# Patient Record
Sex: FEMALE | Race: WHITE | NOT HISPANIC OR LATINO | URBAN - METROPOLITAN AREA
[De-identification: names, ages, dates, MRNs, and addresses within clinical notes are randomized per-mention and may not be internally consistent; named-entity substitution may affect disease eponyms.]

---

## 2017-06-06 ENCOUNTER — APPOINTMENT (RX ONLY)
Dept: URBAN - METROPOLITAN AREA CLINIC 24 | Facility: CLINIC | Age: 67
Setting detail: DERMATOLOGY
End: 2017-06-06

## 2017-06-06 DIAGNOSIS — L81.4 OTHER MELANIN HYPERPIGMENTATION: ICD-10-CM

## 2017-06-06 DIAGNOSIS — D18.0 HEMANGIOMA: ICD-10-CM

## 2017-06-06 DIAGNOSIS — L72.0 EPIDERMAL CYST: ICD-10-CM

## 2017-06-06 DIAGNOSIS — I78.8 OTHER DISEASES OF CAPILLARIES: ICD-10-CM

## 2017-06-06 DIAGNOSIS — D22 MELANOCYTIC NEVI: ICD-10-CM

## 2017-06-06 DIAGNOSIS — L82.1 OTHER SEBORRHEIC KERATOSIS: ICD-10-CM

## 2017-06-06 PROBLEM — J30.1 ALLERGIC RHINITIS DUE TO POLLEN: Status: ACTIVE | Noted: 2017-06-06

## 2017-06-06 PROBLEM — D22.5 MELANOCYTIC NEVI OF TRUNK: Status: ACTIVE | Noted: 2017-06-06

## 2017-06-06 PROBLEM — D18.01 HEMANGIOMA OF SKIN AND SUBCUTANEOUS TISSUE: Status: ACTIVE | Noted: 2017-06-06

## 2017-06-06 PROCEDURE — ? OTHER

## 2017-06-06 PROCEDURE — 99203 OFFICE O/P NEW LOW 30 MIN: CPT

## 2017-06-06 PROCEDURE — ? COUNSELING

## 2017-06-06 ASSESSMENT — LOCATION DETAILED DESCRIPTION DERM
LOCATION DETAILED: RIGHT SUPERIOR LATERAL MALAR CHEEK
LOCATION DETAILED: UPPER STERNUM
LOCATION DETAILED: LEFT LATERAL ABDOMEN
LOCATION DETAILED: RIGHT INFERIOR MEDIAL MIDBACK
LOCATION DETAILED: RIGHT ANTERIOR PROXIMAL UPPER ARM
LOCATION DETAILED: RIGHT ANTERIOR DISTAL UPPER ARM
LOCATION DETAILED: LEFT PERIAREOLAR BREAST 12-1:00 REGION
LOCATION DETAILED: LEFT SUPERIOR MEDIAL UPPER BACK
LOCATION DETAILED: RIGHT BUTTOCK
LOCATION DETAILED: LEFT CENTRAL MALAR CHEEK
LOCATION DETAILED: RIGHT ANTERIOR PROXIMAL THIGH
LOCATION DETAILED: LEFT MID-UPPER BACK

## 2017-06-06 ASSESSMENT — LOCATION SIMPLE DESCRIPTION DERM
LOCATION SIMPLE: LEFT BREAST
LOCATION SIMPLE: RIGHT UPPER ARM
LOCATION SIMPLE: RIGHT LOWER BACK
LOCATION SIMPLE: RIGHT THIGH
LOCATION SIMPLE: RIGHT BUTTOCK
LOCATION SIMPLE: RIGHT CHEEK
LOCATION SIMPLE: LEFT CHEEK
LOCATION SIMPLE: LEFT UPPER BACK
LOCATION SIMPLE: CHEST
LOCATION SIMPLE: ABDOMEN

## 2017-06-06 ASSESSMENT — LOCATION ZONE DERM
LOCATION ZONE: FACE
LOCATION ZONE: TRUNK
LOCATION ZONE: LEG
LOCATION ZONE: ARM

## 2017-06-06 NOTE — PROCEDURE: OTHER
Note Text (......Xxx Chief Complaint.): This diagnosis correlates with the
Detail Level: Simple
Other (Free Text): Discussed can be lasered with Yag

## 2017-10-27 ENCOUNTER — APPOINTMENT (RX ONLY)
Dept: URBAN - METROPOLITAN AREA CLINIC 24 | Facility: CLINIC | Age: 67
Setting detail: DERMATOLOGY
End: 2017-10-27

## 2017-10-27 DIAGNOSIS — L72.0 EPIDERMAL CYST: ICD-10-CM

## 2017-10-27 PROCEDURE — ? COUNSELING

## 2017-10-27 PROCEDURE — ? BENIGN DESTRUCTION COSMETIC

## 2017-10-27 ASSESSMENT — LOCATION DETAILED DESCRIPTION DERM: LOCATION DETAILED: RIGHT LATERAL CANTHUS

## 2017-10-27 ASSESSMENT — LOCATION ZONE DERM: LOCATION ZONE: EYELID

## 2017-10-27 ASSESSMENT — LOCATION SIMPLE DESCRIPTION DERM: LOCATION SIMPLE: RIGHT EYELID

## 2017-10-27 NOTE — PROCEDURE: BENIGN DESTRUCTION COSMETIC
Consent: The patient's consent was obtained including but not limited to risks of crusting, scabbing, blistering, scarring, darker or lighter pigmentary change, recurrence, incomplete removal and infection.
Price (Use Numbers Only, No Special Characters Or $): 20
Post-Care Instructions: I reviewed with the patient in detail post-care instructions. Patient is to wear sunprotection, and avoid picking at any of the treated lesions. Pt may apply Vaseline to crusted or scabbing areas.
Anesthesia Volume In Cc: 0.5
Detail Level: Detailed

## 2018-06-18 ENCOUNTER — APPOINTMENT (RX ONLY)
Dept: URBAN - METROPOLITAN AREA CLINIC 24 | Facility: CLINIC | Age: 68
Setting detail: DERMATOLOGY
End: 2018-06-18

## 2018-06-18 DIAGNOSIS — Z41.9 ENCOUNTER FOR PROCEDURE FOR PURPOSES OTHER THAN REMEDYING HEALTH STATE, UNSPECIFIED: ICD-10-CM

## 2018-06-18 DIAGNOSIS — I78.8 OTHER DISEASES OF CAPILLARIES: ICD-10-CM

## 2018-06-18 DIAGNOSIS — L82.1 OTHER SEBORRHEIC KERATOSIS: ICD-10-CM

## 2018-06-18 DIAGNOSIS — L57.0 ACTINIC KERATOSIS: ICD-10-CM

## 2018-06-18 DIAGNOSIS — L30.4 ERYTHEMA INTERTRIGO: ICD-10-CM

## 2018-06-18 DIAGNOSIS — K13.0 DISEASES OF LIPS: ICD-10-CM

## 2018-06-18 DIAGNOSIS — L81.4 OTHER MELANIN HYPERPIGMENTATION: ICD-10-CM

## 2018-06-18 DIAGNOSIS — D18.0 HEMANGIOMA: ICD-10-CM

## 2018-06-18 PROBLEM — L55.1 SUNBURN OF SECOND DEGREE: Status: ACTIVE | Noted: 2018-06-18

## 2018-06-18 PROBLEM — D18.01 HEMANGIOMA OF SKIN AND SUBCUTANEOUS TISSUE: Status: ACTIVE | Noted: 2018-06-18

## 2018-06-18 PROCEDURE — ? LIQUID NITROGEN (COSMETIC)

## 2018-06-18 PROCEDURE — ? COSMETIC CONSULTATION: FILLERS

## 2018-06-18 PROCEDURE — ? LIQUID NITROGEN

## 2018-06-18 PROCEDURE — 17000 DESTRUCT PREMALG LESION: CPT

## 2018-06-18 PROCEDURE — ? OTHER

## 2018-06-18 PROCEDURE — ? COUNSELING

## 2018-06-18 PROCEDURE — 99214 OFFICE O/P EST MOD 30 MIN: CPT | Mod: 25

## 2018-06-18 PROCEDURE — ? PRESCRIPTION

## 2018-06-18 RX ORDER — ZINC OXIDE 12.8 G/100G
PASTE TOPICAL
Qty: 1 | Refills: 3 | Status: ERX | COMMUNITY
Start: 2018-06-18

## 2018-06-18 RX ORDER — MICONAZOLE NITRATE 20.6 MG/G
POWDER TOPICAL
Qty: 1 | Refills: 5 | Status: ERX | COMMUNITY
Start: 2018-06-18

## 2018-06-18 RX ADMIN — ZINC OXIDE: 12.8 PASTE TOPICAL at 00:00

## 2018-06-18 RX ADMIN — MICONAZOLE NITRATE: 20.6 POWDER TOPICAL at 00:00

## 2018-06-18 ASSESSMENT — LOCATION SIMPLE DESCRIPTION DERM
LOCATION SIMPLE: LEFT CHEEK
LOCATION SIMPLE: ABDOMEN
LOCATION SIMPLE: LEFT SHOULDER
LOCATION SIMPLE: RIGHT CHEEK
LOCATION SIMPLE: RIGHT LIP
LOCATION SIMPLE: RIGHT LOWER BACK
LOCATION SIMPLE: RIGHT SHOULDER
LOCATION SIMPLE: LEFT LIP
LOCATION SIMPLE: LEFT UPPER BACK

## 2018-06-18 ASSESSMENT — LOCATION DETAILED DESCRIPTION DERM
LOCATION DETAILED: RIGHT SUPERIOR MEDIAL BUCCAL CHEEK
LOCATION DETAILED: RIGHT SUPERIOR VERMILION LIP
LOCATION DETAILED: LEFT MID-UPPER BACK
LOCATION DETAILED: SUBXIPHOID
LOCATION DETAILED: RIGHT INFERIOR MEDIAL MIDBACK
LOCATION DETAILED: RIGHT POSTERIOR SHOULDER
LOCATION DETAILED: LEFT ANTERIOR SHOULDER
LOCATION DETAILED: LEFT INFERIOR CENTRAL MALAR CHEEK
LOCATION DETAILED: RIGHT CENTRAL MALAR CHEEK
LOCATION DETAILED: LEFT POSTERIOR SHOULDER
LOCATION DETAILED: LEFT ORAL COMMISSURE

## 2018-06-18 ASSESSMENT — LOCATION ZONE DERM
LOCATION ZONE: ARM
LOCATION ZONE: TRUNK
LOCATION ZONE: FACE
LOCATION ZONE: LIP

## 2018-06-18 NOTE — PROCEDURE: LIQUID NITROGEN (COSMETIC)
Consent: The patient's consent was obtained including but not limited to risks of crusting, scabbing, blistering, scarring, darker or lighter pigmentary change, recurrence, incomplete removal and infection. The patient understands that the procedure is cosmetic in nature and is not covered by insurance.
Detail Level: Detailed
Price (Use Numbers Only, No Special Characters Or $): 50
Post-Care Instructions: I reviewed with the patient in detail post-care instructions. Patient is to wear sunprotection, and avoid picking at any of the treated lesions. Pt may apply Vaseline to crusted or scabbing areas.
Render Post-Care Instructions In Note?: no

## 2018-06-18 NOTE — PROCEDURE: OTHER
Other (Free Text): Discussed laser pt declined
Note Text (......Xxx Chief Complaint.): This diagnosis correlates with the
Other (Free Text): Discussed cosmetic freeze for $50
Detail Level: Simple
Other (Free Text): OTC antifungal cream

## 2019-06-17 ENCOUNTER — APPOINTMENT (RX ONLY)
Dept: URBAN - METROPOLITAN AREA CLINIC 24 | Facility: CLINIC | Age: 69
Setting detail: DERMATOLOGY
End: 2019-06-17

## 2019-06-17 DIAGNOSIS — L98.8 OTHER SPECIFIED DISORDERS OF THE SKIN AND SUBCUTANEOUS TISSUE: ICD-10-CM

## 2019-06-17 DIAGNOSIS — L81.4 OTHER MELANIN HYPERPIGMENTATION: ICD-10-CM

## 2019-06-17 DIAGNOSIS — K13.0 DISEASES OF LIPS: ICD-10-CM

## 2019-06-17 DIAGNOSIS — L82.1 OTHER SEBORRHEIC KERATOSIS: ICD-10-CM

## 2019-06-17 DIAGNOSIS — D18.0 HEMANGIOMA: ICD-10-CM

## 2019-06-17 PROBLEM — I10 ESSENTIAL (PRIMARY) HYPERTENSION: Status: ACTIVE | Noted: 2019-06-17

## 2019-06-17 PROBLEM — D18.01 HEMANGIOMA OF SKIN AND SUBCUTANEOUS TISSUE: Status: ACTIVE | Noted: 2019-06-17

## 2019-06-17 PROCEDURE — ? OTHER

## 2019-06-17 PROCEDURE — 99214 OFFICE O/P EST MOD 30 MIN: CPT

## 2019-06-17 PROCEDURE — ? COUNSELING

## 2019-06-17 ASSESSMENT — LOCATION DETAILED DESCRIPTION DERM
LOCATION DETAILED: LEFT RIB CAGE
LOCATION DETAILED: EPIGASTRIC SKIN
LOCATION DETAILED: LEFT POSTERIOR SHOULDER
LOCATION DETAILED: LEFT ANTERIOR SHOULDER
LOCATION DETAILED: LEFT SUPERIOR LATERAL LOWER BACK
LOCATION DETAILED: RIGHT MID-UPPER BACK
LOCATION DETAILED: RIGHT SUPERIOR VERMILION LIP
LOCATION DETAILED: RIGHT POSTERIOR SHOULDER
LOCATION DETAILED: RIGHT DISTAL RADIAL DORSAL FOREARM
LOCATION DETAILED: LEFT DISTAL PRETIBIAL REGION
LOCATION DETAILED: LEFT SUPERIOR NASAL CHEEK
LOCATION DETAILED: RIGHT CENTRAL MALAR CHEEK
LOCATION DETAILED: LEFT INFERIOR HELIX
LOCATION DETAILED: LEFT ORAL COMMISSURE
LOCATION DETAILED: RIGHT INFERIOR MEDIAL MIDBACK
LOCATION DETAILED: RIGHT ANTERIOR DISTAL THIGH

## 2019-06-17 ASSESSMENT — LOCATION SIMPLE DESCRIPTION DERM
LOCATION SIMPLE: RIGHT CHEEK
LOCATION SIMPLE: LEFT LOWER BACK
LOCATION SIMPLE: LEFT PRETIBIAL REGION
LOCATION SIMPLE: LEFT SHOULDER
LOCATION SIMPLE: LEFT EAR
LOCATION SIMPLE: LEFT LIP
LOCATION SIMPLE: RIGHT LOWER BACK
LOCATION SIMPLE: RIGHT UPPER BACK
LOCATION SIMPLE: RIGHT SHOULDER
LOCATION SIMPLE: RIGHT THIGH
LOCATION SIMPLE: RIGHT LIP
LOCATION SIMPLE: RIGHT FOREARM
LOCATION SIMPLE: LEFT CHEEK
LOCATION SIMPLE: ABDOMEN

## 2019-06-17 ASSESSMENT — LOCATION ZONE DERM
LOCATION ZONE: TRUNK
LOCATION ZONE: ARM
LOCATION ZONE: LEG
LOCATION ZONE: LIP
LOCATION ZONE: FACE
LOCATION ZONE: EAR

## 2019-06-17 NOTE — PROCEDURE: OTHER
Other (Free Text): OTC antifungal cream\\nCan put  to help with collecting moisture
Note Text (......Xxx Chief Complaint.): This diagnosis correlates with the
Detail Level: Simple
Other (Free Text): Can have laser treatment if desires with Clarice

## 2019-07-22 ENCOUNTER — HOSPITAL ENCOUNTER (OUTPATIENT)
Dept: PHYSICAL THERAPY | Age: 69
Discharge: HOME | End: 2019-07-22
Payer: MEDICARE

## 2019-07-22 PROCEDURE — 97161 PT EVAL LOW COMPLEX 20 MIN: CPT

## 2019-07-22 PROCEDURE — 97110 THERAPEUTIC EXERCISES: CPT

## 2019-07-22 NOTE — PROGRESS NOTES
Erin Garg Trever  : 1950  [place]  Secondary: [address] at [address]  [phone]   [phone]    OUTPATIENT PHYSICAL THERAPY: Daily Treatment Note 2019  Visit Count:  1    ICD-10: Treatment Diagnosis: pain in joint, shoulder, left (M25.512); adhesive capsulitis of shoulder, left (M75.02)   Precautions/Allergies:   Penicillin v and Potassium   TREATMENT PLAN:  Effective Dates: 2019 TO 2019 (60 days). Frequency/Duration: 2 times a week for 60 Day(s)    Pre-treatment Symptoms/Complaints:  Patient reports tightness and tenderness in her shoulder   Pain: Initial: Pain Intensity 1: 8 10 Post Session:  4/10   Medications Last Reviewed:  2019  Updated Objective Findings:  See evaluation note from today  TREATMENT:     Manual Therapy (     ): Manual techniques to facilitate improved motion and decreased pain. (Used abbreviations: MET - muscle energy technique; PNF - proprioceptive neuromuscular facilitation; NMR - neuromuscular re-education; a/p - anterior to posterior; p/a - posterior to anterior)   · None today   Therapeutic Exercise: (10 Minutes):  Exercises per grid below to improve mobility and strength. Required minimal manual and tactile cues to promote proper body mechanics. Progressed resistance, range, repetitions and complexity of movement as indicated. 2019   Activity/Exercise Parameters                 Patient education Plan of care, expectations with progression   t-bar ER  10 x 10 sec hold    aarom flexion  Supine and counter walk backs each 10 x 10 sec                        [facility] Portal  Treatment/Session Summary:    · Response to Treatment:  Patient reported improved stiffness following treatment.  .  · Communication/Consultation:  None today  · Equipment provided today:  HEP  · Recommendations/Intent for next treatment session: Next visit will focus on progression of passive ROM.     Total Treatment Billable Duration:  10 minutes  PT Patient Time In/Time Out  Time In: 0930  Time Out: 56  Juan Rosenbaum, PT    Future Appointments   Date Time Provider Jc Rose   7/24/2019  9:30 AM [facility]   8/6/2019 10:30 AM Shamika Miller, PT [facility]   8/8/2019 10:30 AM Enriqueta RODRIGEZ, PT [facility]   8/13/2019 10:30 AM Enriqueta RODRIGEZ, PT [facility]   8/15/2019 10:30 AM Enriqueta RODRIGEZ, PT [facility]   8/20/2019 10:30 AM Enriqueta RODRIGEZ, PT [facility]   8/22/2019 10:30 AM Enriqueta RODRIGEZ, PT [facility]   8/27/2019 10:30 AM Enriqueta RODRIGEZ, PT [facility]   8/29/2019 10:30 AM Andrew Barajas, PT [facility]

## 2019-07-22 NOTE — THERAPY EVALUATION
Indy Perera  : 1950  [place]  Secondary: [address] at [address]  [phone]   [phone]       OUTPATIENT PHYSICAL THERAPY:Initial Assessment 2019   ICD-10: Treatment Diagnosis: pain in joint, shoulder, left (M25.512); adhesive capsulitis of shoulder, left (M75.02)   Precautions/Allergies:   Penicillin v and Potassium   TREATMENT PLAN:  Effective Dates: 2019 TO 2019 (60 days). Frequency/Duration: 2 times a week for 60 Day(s) MEDICAL/REFERRING DIAGNOSIS:  Left shoulder pain [M25.512]   DATE OF ONSET: 4-5 months   REFERRING PHYSICIAN: Alexis Elias MD MD Orders: evaluate and treat   Return MD Appointment: 1 month      INITIAL ASSESSMENT:  Ms. Ivon Albert is a 80year old female with left shoulder pain that began 4-5 months ago with no specific mechanism or injury. She reports a gradual worsening of movement and pain that has limited her ability to reach, lift, and get dressed without pain. She presents to PT with decreased passive and active ROM, mildly decreased strength, decreased functional reaching, and decreased joint mobility that contribute to her functional limitations. Pt will benefit from skilled PT to address above listed impairments and functional limitations to facilitate return to prior level of function. PROBLEM LIST (Impacting functional limitations):  1. Decreased Strength  2. Decreased ADL/Functional Activities  3. Increased Pain  4. Decreased Flexibility/Joint Mobility INTERVENTIONS PLANNED: (Treatment may consist of any combination of the following)  1. Cryotherapy  2. Heat  3. Home Exercise Program (HEP)  4. Manual Therapy  5. Neuromuscular Re-education/Strengthening  6. Range of Motion (ROM)  7. Therapeutic Activites  8.  Therapeutic Exercise/Strengthening     GOALS: (Goals have been discussed and agreed upon with patient.)  Discharge Goals: Time Frame: by 9/20/19   1. Patient will report no limitation in reaching into an overhead cabinet to put away dishes. 2. Patient will report no limitation in reaching behind her back to wash her back or put on her robe. 3. Patient will demonstrate improvement in function with quickDASH to 20% or less disability. OUTCOME MEASURE:   Tool Used: Disabilities of the Arm, Shoulder and Hand (DASH) Questionnaire - Quick Version  Score:  Initial: 34/55 (52% disability)   Most Recent: X/55 (Date: -- )   Interpretation of Score: The DASH is designed to measure the activities of daily living in person's with upper extremity dysfunction or pain. Each section is scored on a 1-5 scale, 5 representing the greatest disability. The scores of each section are added together for a total score of 55. MEDICAL NECESSITY:   · Patient is expected to demonstrate progress in strength and range of motion to increase independence with reaching. REASON FOR SERVICES/OTHER COMMENTS:  · Patient has demonstrated an improvement in functional level by demonstrating improvement in functional reaching. Total Duration:  PT Patient Time In/Time Out  Time In: 0930  Time Out: 1030    Rehabilitation Potential For Stated Goals: Good  Regarding Selena Cedeno therapy, I certify that the treatment plan above will be carried out by a therapist or under their direction. Thank you for this referral,  Baylee Gan, PT     Referring Physician Signature: Tuyet Anderson MD                 PAIN/SUBJECTIVE:   Initial: Pain Intensity 1: 8  Post Session:  4/10   HISTORY:   History of Injury/Illness (Reason for Referral):  Patient reports left shoulder pain that began with no mechanism or injury 4-5 months ago and has gradually worsened. She reports pain that ranges from 4-8/10 and worsens with reaching and lifting.   She reports difficulty reaching overhead, reaching behind her back to wash her back and put on her robe, and to the back of her head. She reports that she saw her PCP who referred her to a neurologist and had a NCV that was unremarkable and that she then saw Dr Steph Harden who diagnosed frozen shoulder. She reports some temporary improvement with a shoulder injection. Past Medical History/Comorbidities:   Ms. Janessa Farooq  has a past medical history of Cancer of kidney ([facility] [address] (2013), Fatigue, Fibromyalgia, Hypertension (1998), Hypothyroidism (2001), Infection of left inner ear (2009), Interstitial cystitis, RA (rheumatoid arthritis) ([address] (2005), and Reflux esophagitis. Ms. Janessa Farooq  has a past surgical history that includes hx hysterectomy (1976); hx cholecystectomy (2001); and hx nephrectomy (2013). Social History/Living Environment:     Patient lives in a private home with her spouse. Prior Level of Function/Work/Activity:  Patient is retired. No prior limitation in reaching or lifting reported. Dominant Side:         RIGHT   Ambulatory/Rehab Services H2 Model Falls Risk Assessment   Risk Factors:       No Risk Factors Identified Ability to Rise from Chair:       (1)  Pushes up, successful in one attempt   Falls Prevention Plan:       No modifications necessary   Total: (5 or greater = High Risk): 1   ©2010 [employer]. All Rights Reserved. [facility] Patent #[id number]. Federal Law prohibits the replication, distribution or use without written permission from [employer]   Current Medications:       Current Outpatient Medications:     budesonide-formoterol (SYMBICORT) 160-4.5 mcg/actuation HFA inhaler, Take 2 Puffs by inhalation two (2) times a day., Disp: , Rfl:     fluticasone (FLONASE) 50 mcg/actuation nasal spray, nightly., Disp: , Rfl:     diphenhydrAMINE (BENADRYL ALLERGY) 25 mg tablet, Take 25 mg by mouth every six (6) hours as needed for Sleep., Disp: , Rfl:     azelastine (ASTELIN) 137 mcg nasal spray, 1 Spray two (2) times a day.  Use in each nostril as directed, Disp: , Rfl:     cetirizine (ZYRTEC) 10 mg tablet, Take  by mouth daily. , Disp: , Rfl:     guaiFENesin-dextromethorphan (MUCUS RELIEF DM)  mg tab tablet, Take 1 Tab by mouth daily. , Disp: , Rfl:     omeprazole (PRILOSEC) 20 mg capsule, Take 20 mg by mouth two (2) times daily as needed. , Disp: , Rfl:     levothyroxine (SYNTHROID) 112 mcg tablet, Take  by mouth Daily (before breakfast). , Disp: , Rfl:     losartan-hydrochlorothiazide (HYZAAR) 100-25 mg per tablet, Take 1 tablet by mouth daily. , Disp: , Rfl:     folic acid 739 mcg tablet, Take 1,600 mcg by mouth daily. , Disp: , Rfl:     ibuprofen (MOTRIN) 200 mg tablet, Take 200 mg by mouth as needed. , Disp: , Rfl:     multivitamin (ONE A DAY) tablet, Take 1 Tab by mouth daily. Ca 220, D 500, Disp: , Rfl:     cyanocobalamin (VITAMIN B-12) 100 mcg tablet, Take 100 mcg by mouth daily. , Disp: , Rfl:     pentosan polysulfate sodium (ELMIRON) 100 mg capsule, Take 200 mg by mouth nightly., Disp: , Rfl:    Date Last Reviewed:  7/22/2019   Number of Personal Factors/Comorbidities that affect the Plan of Care: 0: LOW COMPLEXITY   EXAMINATION:   Observation/palpation/joint mobility: hypomobility and mild pain reported with posterior and inferior glenohumeral joint mobilizations on left compared to right     Range of Motion:  Passive (active)  Left* Right   Flexion  130 (102) 165 (155)    External Rotation  52 80   Internal Rotation  53 65      Strength:   Date: 7/22/19   Manual Muscle Test out of 5   Flexion B: 4+/5    Abduction L: 3/5, R: 4+/5    External Rotation B: 4/5    Internal Rotaton L: 4/5, R: 4+/5      Functional Tests:    Apley's Internal Rotation: L: buttock, R: T12  Apley's External Rotation: L: base of skull, R: T3   Forward Flexion: scapular hiking >90 flexion        Body Structures Involved:  1. Joints  2. Muscles Body Functions Affected:  1. Sensory/Pain  2. Neuromusculoskeletal  3. Movement Related Activities and Participation Affected:  1. General Tasks and Demands  2.  Self Care  3. Domestic Life  4. Interpersonal Interactions and Relationships  5.  Community, Social and [place]   Number of elements (examined above) that affect the Plan of Care: 4+: HIGH COMPLEXITY   CLINICAL PRESENTATION:   Presentation: Stable and uncomplicated: LOW COMPLEXITY   CLINICAL DECISION MAKING:   Use of outcome tool(s) and clinical judgement create a POC that gives a: Clear prediction of patient's progress: LOW COMPLEXITY

## 2019-07-24 ENCOUNTER — HOSPITAL ENCOUNTER (OUTPATIENT)
Dept: PHYSICAL THERAPY | Age: 69
Discharge: HOME | End: 2019-07-24
Payer: MEDICARE

## 2019-07-24 PROCEDURE — 97110 THERAPEUTIC EXERCISES: CPT

## 2019-07-24 PROCEDURE — 97140 MANUAL THERAPY 1/> REGIONS: CPT

## 2019-07-24 NOTE — PROGRESS NOTES
Lyubov Perera  : 1950  [place]  Secondary: [address] at [facility]  [address], [address]  [phone]   [phone]    OUTPATIENT PHYSICAL THERAPY: Daily Treatment Note 2019  Visit Count:  2    ICD-10: Treatment Diagnosis: pain in joint, shoulder, left (M25.512); adhesive capsulitis of shoulder, left (M75.02)   Precautions/Allergies:   Penicillin v and Potassium   TREATMENT PLAN:  Effective Dates: 2019 TO 2019 (60 days). Frequency/Duration: 2 times a week for 60 Day(s)    Pre-treatment Symptoms/Complaints:  Patient reports that her shoulder is doing a little bit better but that she wants to review her HEP before she travels for vacation next week. Pain: Initial: Pain Intensity 1: 4 /10 Post Session:  4/10   Medications Last Reviewed:  2019  Updated Objective Findings:  see below   Range of Motion:  Passive (active)  Left* Right   Flexion  130 (102) 165 (155)    External Rotation  52 80   Internal Rotation  53 65        TREATMENT:     Manual Therapy (    Soft Tissue Mobilization Duration  Duration: 30 Minutes): Manual techniques to facilitate improved motion and decreased pain. (Used abbreviations: MET - muscle energy technique; PNF - proprioceptive neuromuscular facilitation; NMR - neuromuscular re-education; a/p - anterior to posterior; p/a - posterior to anterior)   · Manual joint mobilizations: posterior, inferior, and posterior/inferior glenohumeral joint mobilizations  · Long axis traction   · Manual ROM with contract relax techniques: flexion, IR, and ER at neutral and 90 degrees     Therapeutic Exercise: (23 Minutes):  Exercises per grid below to improve mobility and strength. Required minimal manual and tactile cues to promote proper body mechanics. Progressed resistance, range, repetitions and complexity of movement as indicated.        2019   Activity/Exercise Parameters Patient education IR stretch to HEP    t-bar ER  10 x 10 sec hold    aarom flexion supine  Supine 10 x 10 sec    IR stretch with strap  10 x 10 sec hold    AAROM flexion standing  Ball with endrange hold: 10 x 10 sec, ball up 1 wedge 30 reps                [employer] Portal  Treatment/Session Summary:    · Response to Treatment:  Patient reported some soreness with IR stretch today. She tolerated the rest of the treatment well and did not demonstrate improvement in quantity of motion, but in report of pain with achieving that motion. Added IR stretch to HEP for patient to perform while on vacation next week. · Communication/Consultation:  None today  · Equipment provided today:  HEP  · Recommendations/Intent for next treatment session: Next visit will focus on progression of passive ROM.     Total Treatment Billable Duration:  53 minutes  PT Patient Time In/Time Out  Time In: 0930  Time Out: 1  Amelia Ibarra PT    Future Appointments   Date Time Provider Jc Rose   8/6/2019 10:30 AM Zeferino Peralta [place] [facility]   8/8/2019 10:30 AM Zeferino Peralta, PT [facility]   8/13/2019 10:30 AM Ethan RODRIGEZ, PT [facility]   8/15/2019 10:30 AM Ethan RODRIGEZ, PT [facility]   8/20/2019 10:30 AM Ethan RODRIGEZ, PT [facility]   8/22/2019 10:30 AM Ethan RODRIGEZ, PT [facility]   8/27/2019 10:30 AM Ethna RODRIGEZ, PT [facility]   8/29/2019 10:30 AM Keshawn Barajas, PT [facility]

## 2019-08-06 ENCOUNTER — HOSPITAL ENCOUNTER (OUTPATIENT)
Dept: PHYSICAL THERAPY | Age: 69
Discharge: HOME | End: 2019-08-06
Payer: MEDICARE

## 2019-08-06 PROCEDURE — 97140 MANUAL THERAPY 1/> REGIONS: CPT

## 2019-08-06 PROCEDURE — 97110 THERAPEUTIC EXERCISES: CPT

## 2019-08-06 NOTE — PROGRESS NOTES
Honey Perera  : 1950  [place]  Secondary: [address] at [address]  [phone]   [phone]    OUTPATIENT PHYSICAL THERAPY: Daily Treatment Note 2019  Visit Count:  3    ICD-10: Treatment Diagnosis: pain in joint, shoulder, left (M25.512); adhesive capsulitis of shoulder, left (M75.02)   Precautions/Allergies:   Penicillin v and Potassium   TREATMENT PLAN:  Effective Dates: 2019 TO 2019 (60 days). Frequency/Duration: 2 times a week for 60 Day(s)    Pre-treatment Symptoms/Complaints:  Patient reports that she did not do a lot of her exercises over her vacation, but that she worked it in the water and that it seems to be doing better. Pain: Initial: Pain Intensity 1: 3 /10 Post Session:  4/10   Medications Last Reviewed:  2019  Updated Objective Findings:  see below   Range of Motion:  Passive (active)  Left* Right   Flexion  145 (102) 165 (155)    External Rotation  70 80   Internal Rotation  60 65        TREATMENT:     Manual Therapy (    Soft Tissue Mobilization Duration  Duration: 25 Minutes): Manual techniques to facilitate improved motion and decreased pain. (Used abbreviations: MET - muscle energy technique; PNF - proprioceptive neuromuscular facilitation; NMR - neuromuscular re-education; a/p - anterior to posterior; p/a - posterior to anterior)   · Manual joint mobilizations: posterior, inferior, and posterior/inferior glenohumeral joint mobilizations  · Long axis traction   · Manual ROM with contract relax techniques: flexion, IR, and ER at neutral and 90 degrees     Therapeutic Exercise: (28 Minutes):  Exercises per grid below to improve mobility and strength. Required minimal manual and tactile cues to promote proper body mechanics. Progressed resistance, range, repetitions and complexity of movement as indicated.        2019   Activity/Exercise Parameters                 Patient education --   t-bar ER  --   aarom flexion supine  Supine 10 x 10 sec with cane    IR stretch with strap  10 x 10 sec hold    AAROM flexion standing  Ball up wall: 2 x 10    Sidelying ER  3 x 10            [facility] Portal  Treatment/Session Summary:    · Response to Treatment:  Patient demonstrates improvement in passive range of motion today and overall improvement in subjective report of function. · Communication/Consultation:  None today  · Equipment provided today:  HEP  · Recommendations/Intent for next treatment session: Next visit will focus on progression of active ROM.     Total Treatment Billable Duration:  53 minutes  PT Patient Time In/Time Out  Time In: 1030  Time Out: Michele Myers PT    Future Appointments   Date Time Provider Jc Rose   8/8/2019 10:30 AM Parul Rodriges [facility]   8/13/2019 10:30 AM Robert Frias PT [facility]   8/15/2019 10:30 AM Keely RODRIGEZ, PT [facility]   8/20/2019 10:30 AM Keely RODRIGEZ, PT [facility]   8/22/2019 10:30 AM Keely RODRIGEZ, PT [facility]   8/27/2019 10:30 AM Keely RODRIGEZ, PT [facility]   8/29/2019 10:30 AM Eric Barajas PT [facility]

## 2019-08-08 ENCOUNTER — HOSPITAL ENCOUNTER (OUTPATIENT)
Dept: PHYSICAL THERAPY | Age: 69
Discharge: HOME | End: 2019-08-08
Payer: MEDICARE

## 2019-08-08 PROCEDURE — 97140 MANUAL THERAPY 1/> REGIONS: CPT

## 2019-08-08 PROCEDURE — 97110 THERAPEUTIC EXERCISES: CPT

## 2019-08-08 NOTE — PROGRESS NOTES
Marciano Perera  : 1950  [place]  Secondary: [address] at [facility], [address]  [phone]   [phone]    OUTPATIENT PHYSICAL THERAPY: Daily Treatment Note 2019  Visit Count:  4    ICD-10: Treatment Diagnosis: pain in joint, shoulder, left (M25.512); adhesive capsulitis of shoulder, left (M75.02)   Precautions/Allergies:   Penicillin v and Potassium   TREATMENT PLAN:  Effective Dates: 2019 TO 2019 (60 days). Frequency/Duration: 2 times a week for 60 Day(s)    Pre-treatment Symptoms/Complaints:  Patient reports that her shoulder was mildly sore after her last treatment but overall better. Pain: Initial: Pain Intensity 1: 2 10 Post Session:  4/10   Medications Last Reviewed:  2019  Updated Objective Findings:  see below   Range of Motion:  Passive (active)  Left* Right   Flexion  145 (102) 165 (155)    External Rotation  70 80   Internal Rotation  60 65        TREATMENT:     Manual Therapy (    Soft Tissue Mobilization Duration  Duration: 25 Minutes): Manual techniques to facilitate improved motion and decreased pain. (Used abbreviations: MET - muscle energy technique; PNF - proprioceptive neuromuscular facilitation; NMR - neuromuscular re-education; a/p - anterior to posterior; p/a - posterior to anterior)   · Manual joint mobilizations: posterior, inferior, and posterior/inferior glenohumeral joint mobilizations  · Long axis traction   · Manual ROM with contract relax techniques: flexion, IR, and ER at neutral and 90 degrees     Therapeutic Exercise: (15 Minutes):  Exercises per grid below to improve mobility and strength. Required minimal manual and tactile cues to promote proper body mechanics. Progressed resistance, range, repetitions and complexity of movement as indicated.        2019   Activity/Exercise Parameters                 Patient education --   t-bar ER --   aarom flexion supine  Supine 10 x 10 sec with cane    IR stretch with strap  10 x 10 sec hold    AAROM flexion standing  Ball up wall: 2 x 10    Sidelying ER  --   Rows  Green cord: 2 x 10    Resisted ER  2 x 10 orange cord    Resisted IR  2 x 10 green cord       MedBridge Portal  Treatment/Session Summary:    · Response to Treatment:  Patient continues to progress well. Patient with increased soreness following IR stretching, but overall good tolerance. · Communication/Consultation:  None today  · Equipment provided today:  HEP  · Recommendations/Intent for next treatment session: Next visit will focus on progression of active ROM.     Total Treatment Billable Duration:  45 minutes  PT Patient Time In/Time Out  Time In: 1035  Time Out: Brie 1, PT    Future Appointments   Date Time Provider Jc Rose   8/13/2019 10:30 AM Parul Montes [facility]   8/15/2019 10:30 AM China RODRIGEZ, PT [facility]   8/20/2019 10:30 AM China RODRIGEZ [facility]   8/22/2019 10:30 AM China RODRIGEZ, [facility]   8/27/2019 10:30 AM China RODRIGEZ, PT [facility]   8/29/2019 10:30 AM Milan Barajas, PT [facility]

## 2019-08-13 ENCOUNTER — HOSPITAL ENCOUNTER (OUTPATIENT)
Dept: PHYSICAL THERAPY | Age: 69
Discharge: HOME | End: 2019-08-13
Payer: MEDICARE

## 2019-08-13 PROCEDURE — 97110 THERAPEUTIC EXERCISES: CPT

## 2019-08-13 PROCEDURE — 97140 MANUAL THERAPY 1/> REGIONS: CPT

## 2019-08-13 NOTE — PROGRESS NOTES
Lyubov Perera  : 1950  [place]  Secondary: [address] at [address]  [phone]   [phone]    OUTPATIENT PHYSICAL THERAPY: Daily Treatment Note 2019  Visit Count:  5    ICD-10: Treatment Diagnosis: pain in joint, shoulder, left (M25.512); adhesive capsulitis of shoulder, left (M75.02)   Precautions/Allergies:   Penicillin v and Potassium   TREATMENT PLAN:  Effective Dates: 2019 TO 2019 (60 days). Frequency/Duration: 2 times a week for 60 Day(s)    Pre-treatment Symptoms/Complaints:  Patient reports that she is doing well with washing her back now. Pain: Initial: Pain Intensity 1: 10 Post Session:  2/10   Medications Last Reviewed:  2019  Updated Objective Findings:  see below   Range of Motion:  Passive (active)  Left* Right   Flexion  145 (125) 165 (155)    External Rotation  70 80   Internal Rotation  60 65        TREATMENT:     Manual Therapy (    Soft Tissue Mobilization Duration  Duration: 40 Minutes): Manual techniques to facilitate improved motion and decreased pain. (Used abbreviations: MET - muscle energy technique; PNF - proprioceptive neuromuscular facilitation; NMR - neuromuscular re-education; a/p - anterior to posterior; p/a - posterior to anterior)   · Manual joint mobilizations: posterior, inferior, and posterior/inferior glenohumeral joint mobilizations  · Long axis traction   · Manual ROM with contract relax techniques: flexion, IR, and ER at neutral and 90 degrees  · STM to subscapularis and upper trapezius      Therapeutic Exercise: (15 Minutes):  Exercises per grid below to improve mobility and strength. Required minimal manual and tactile cues to promote proper body mechanics. Progressed resistance, range, repetitions and complexity of movement as indicated.        2019   Activity/Exercise Parameters                 Patient education --   t-bar ER  --   aarom flexion supine  Supine 10 x 10 sec with cane    IR stretch with strap  10 x 10 sec hold    AAROM flexion standing  Ball up wall: 2 x 10 and wall slides 10 x 10 sec    Sidelying ER  --   Rows  --   Resisted ER  --   Resisted IR  --       [facility] Portal  Treatment/Session Summary:    · Response to Treatment:  Patient demonstrated gains in active ROM noted today. · Communication/Consultation:  None today  · Equipment provided today:  None today  · Recommendations/Intent for next treatment session: Next visit will focus on progression of active ROM.     Total Treatment Billable Duration:  45 minutes  PT Patient Time In/Time Out  Time In: 1030  Time Out: [phone]  Heidi Friends, PT    Future Appointments   Date Time Provider Jc Rose   8/15/2019 10:30 AM Parul Ya   [facility]   [facility]:30 [facility]   8/27/2019 10:30 [facility]   8/29/2019 10:30 AM Giselle Barajas, [facility]

## 2019-08-15 ENCOUNTER — HOSPITAL ENCOUNTER (OUTPATIENT)
Dept: PHYSICAL THERAPY | Age: 69
Discharge: HOME | End: 2019-08-15
Payer: MEDICARE

## 2019-08-15 PROCEDURE — 97110 THERAPEUTIC EXERCISES: CPT

## 2019-08-15 PROCEDURE — 97140 MANUAL THERAPY 1/> REGIONS: CPT

## 2019-08-15 NOTE — PROGRESS NOTES
Beuford Landau Styles  : 1950  [place]  Secondary: [address] at [place] [id number] [address], [address]  [phone]   [phone]    OUTPATIENT PHYSICAL THERAPY: Daily Treatment Note 8/15/2019  Visit Count:  6    ICD-10: Treatment Diagnosis: pain in joint, shoulder, left (M25.512); adhesive capsulitis of shoulder, left (M75.02)   Precautions/Allergies:   Penicillin v and Potassium   TREATMENT PLAN:  Effective Dates: 2019 TO 2019 (60 days). Frequency/Duration: 2 times a week for 60 Day(s)    Pre-treatment Symptoms/Complaints:  Patient reports that her shoulder isn't really sore unless she raises it. Pain: Initial: Pain Intensity 1: 0 /10 Post Session:  0/10   Medications Last Reviewed:  8/15/2019  Updated Objective Findings:  see below   Range of Motion:  Passive (active)  Left* Right   Flexion  145 (125) 165 (155)    External Rotation  70 80   Internal Rotation  60 65        TREATMENT:     Manual Therapy (    Soft Tissue Mobilization Duration  Duration: 30 Minutes): Manual techniques to facilitate improved motion and decreased pain. (Used abbreviations: MET - muscle energy technique; PNF - proprioceptive neuromuscular facilitation; NMR - neuromuscular re-education; a/p - anterior to posterior; p/a - posterior to anterior)   · Manual joint mobilizations: posterior, inferior, and posterior/inferior glenohumeral joint mobilizations  · Long axis traction   · Manual ROM with contract relax techniques: flexion, IR, and ER at neutral and 90 degrees  · STM to subscapularis and upper trapezius      Therapeutic Exercise: (23 Minutes):  Exercises per grid below to improve mobility and strength. Required minimal manual and tactile cues to promote proper body mechanics. Progressed resistance, range, repetitions and complexity of movement as indicated.        8/15/2019   Activity/Exercise Parameters                 Patient education --   t-bar ER  --   aarom flexion supine  Supine 10 x 10 sec with cane    IR stretch with strap  --   AAROM flexion standing  Ball up wall: 2 x 10 and wall slides 10 x 10 sec    Sidelying ER  --   Rows  Green cord: 3 x 10    Resisted ER  [place] cord: 3 x 10    Resisted IR  Orange cord: 3 x 10        MedBridge Portal  Treatment/Session Summary:    · Response to Treatment:  Patient demonstrated improvement in quality of motion with active flexion today. · Communication/Consultation:  None today  · Equipment provided today:  None today  · Recommendations/Intent for next treatment session: Next visit will focus on progression of active ROM.     Total Treatment Billable Duration:  53 minutes  PT Patient Time In/Time Out  Time In: 1035  Time Out: 36  Juli Newman, PT    Future Appointments   Date Time Provider Jc Rose   8/20/2019 10:30 AM Parul Gutiérrez [facility]   8/22/2019 10:30 AM Starr Isaacs PT [facility]   8/27/2019 10:30 AM Starr Isaacs PT [facility]   8/29/2019 10:30 AM Reshma Barajas PT [facility]

## 2019-08-20 ENCOUNTER — APPOINTMENT (OUTPATIENT)
Dept: PHYSICAL THERAPY | Age: 69
End: 2019-08-20
Payer: MEDICARE

## 2019-08-22 ENCOUNTER — HOSPITAL ENCOUNTER (OUTPATIENT)
Dept: PHYSICAL THERAPY | Age: 69
Discharge: HOME | End: 2019-08-22
Payer: MEDICARE

## 2019-08-22 PROCEDURE — 97140 MANUAL THERAPY 1/> REGIONS: CPT

## 2019-08-22 PROCEDURE — 97110 THERAPEUTIC EXERCISES: CPT

## 2019-08-22 NOTE — PROGRESS NOTES
Roderick Field Styles  : 1950  [place]  Secondary: [address] at [address]  [phone]   [phone]    OUTPATIENT PHYSICAL THERAPY: Daily Treatment Note 2019  Visit Count:  7    ICD-10: Treatment Diagnosis: pain in joint, shoulder, left (M25.512); adhesive capsulitis of shoulder, left (M75.02)   Precautions/Allergies:   Penicillin v and Potassium   TREATMENT PLAN:  Effective Dates: 2019 TO 2019 (60 days). Frequency/Duration: 2 times a week for 60 Day(s)    Pre-treatment Symptoms/Complaints:  Patient reports that her shoulder isn't really sore unless she raises it. Pain: Initial: Pain Intensity 1: 0 /10 Post Session:  0/10   Medications Last Reviewed:  2019  Updated Objective Findings:  see below   Range of Motion:  Passive (active)  Left* Right   Flexion  155 (125) 165 (155)    External Rotation  70 80   Internal Rotation  60 65        TREATMENT:     Manual Therapy (    Soft Tissue Mobilization Duration  Duration: 25 Minutes): Manual techniques to facilitate improved motion and decreased pain. (Used abbreviations: MET - muscle energy technique; PNF - proprioceptive neuromuscular facilitation; NMR - neuromuscular re-education; a/p - anterior to posterior; p/a - posterior to anterior)   · Manual joint mobilizations: posterior, inferior, and posterior/inferior glenohumeral joint mobilizations  · Long axis traction   · Manual ROM with contract relax techniques: flexion, IR, and ER at neutral and 90 degrees  · STM to subscapularis and upper trapezius      Therapeutic Exercise: (30 Minutes):  Exercises per grid below to improve mobility and strength. Required minimal manual and tactile cues to promote proper body mechanics. Progressed resistance, range, repetitions and complexity of movement as indicated.        2019   Activity/Exercise Parameters                 Patient education --   aarom flexion supine  Supine 10 x 10 sec with cane    IR stretch with strap  10 x 10 sec    Self mobilization Flexion and cross body with purple strap 2 x 1 minute    AAROM flexion standing  Ball up wall: 2 x 10    Sidelying ER  --   Rows  Green cord: 3 x 10    Resisted ER  [place] cord: 3 x 10    Resisted IR  Orange cord: 3 x 10        MedBridge Portal  Treatment/Session Summary:    · Response to Treatment:  Patient continues to progress in mobility. · Communication/Consultation:  None today  · Equipment provided today:  None today  · Recommendations/Intent for next treatment session: Next visit will focus on progression of active ROM.     Total Treatment Billable Duration:  55 minutes  PT Patient Time In/Time Out  Time In: 1030  Time Out: 36  Baylee Gan PT    Future Appointments   Date Time Provider Jc Quevedoi   8/27/2019 10:30 AM Parul Red    8/29/2019 10:30 AM Mckinley Lr PT    9/3/2019  2:30 PM Christian Lawrence MD [address] [place] [address]

## 2019-08-27 ENCOUNTER — HOSPITAL ENCOUNTER (OUTPATIENT)
Dept: PHYSICAL THERAPY | Age: 69
Discharge: HOME | End: 2019-08-27
Payer: MEDICARE

## 2019-08-27 PROCEDURE — 97140 MANUAL THERAPY 1/> REGIONS: CPT

## 2019-08-27 PROCEDURE — 97110 THERAPEUTIC EXERCISES: CPT

## 2019-08-27 NOTE — PROGRESS NOTES
Erin Garg Trever  : 1950  [place]  Secondary: [address] at [address]  [phone]   [phone]    OUTPATIENT PHYSICAL THERAPY: Daily Treatment Note 2019  Visit Count:  8    ICD-10: Treatment Diagnosis: pain in joint, shoulder, left (M25.512); adhesive capsulitis of shoulder, left (M75.02)   Precautions/Allergies:   Penicillin v and Potassium   TREATMENT PLAN:  Effective Dates: 2019 TO 2019 (60 days). Frequency/Duration: 2 times a week for 60 Day(s)    Pre-treatment Symptoms/Complaints:  Patient reports she thinks her shoulder is doing well. Pain: Initial: Pain Intensity 1: 0 /10 Post Session:  0/10   Medications Last Reviewed:  2019  Updated Objective Findings:  see below   Range of Motion:  Passive (active)  Left* Right   Flexion  155 (145) 165 (155)    External Rotation  75 80   Internal Rotation  60 65        TREATMENT:     Manual Therapy (    Soft Tissue Mobilization Duration  Duration: 15 Minutes): Manual techniques to facilitate improved motion and decreased pain. (Used abbreviations: MET - muscle energy technique; PNF - proprioceptive neuromuscular facilitation; NMR - neuromuscular re-education; a/p - anterior to posterior; p/a - posterior to anterior)   · Manual joint mobilizations: posterior, inferior, and posterior/inferior glenohumeral joint mobilizations  · Long axis traction   · Manual ROM with contract relax techniques: flexion, IR, and ER at neutral and 90 degrees  · STM to subscapularis and upper trapezius      Therapeutic Exercise: (30 Minutes):  Exercises per grid below to improve mobility and strength. Required minimal manual and tactile cues to promote proper body mechanics. Progressed resistance, range, repetitions and complexity of movement as indicated.        2019   Activity/Exercise Parameters                 Patient education -- aarom flexion supine  Supine 10 x 10 sec with cane and 10 with 1 lb weight    IR stretch with strap  --   Self mobilization Flexion and cross body with purple strap 2 x 1 minute    AAROM flexion standing  Ball up wall: 2 x 10, 10 wall slides    Sidelying ER  --   Rows  Green cord: 3 x 10    Resisted ER  [place] cord: 3 x 10    Resisted IR  [place]ange cord: 3 x 10    Ball on wall  Yellow weighted ball: 2 x 10 each flexion overhead and diagonals        [facility]  Treatment/Session Summary:    · Response to Treatment:  Patient with some soreness reported with increased weights with flexion activities. Treatment was shortened as a result. Plan to reassess progress and progress HEP next visit. · Communication/Consultation:  None today  · Equipment provided today:  None today  · Recommendations/Intent for next treatment session: Next visit will focus on progression of active ROM.     Total Treatment Billable Duration:  45 minutes  PT Patient Time In/Time Out  Time In: 1030  Time Out: Brie 1, PT    Future Appointments   Date Time Provider Jc Rose   8/29/2019 10:30 AM Parul Montes [facility]   9/3/2019  2:30 PM Chago Boggs MD [facility]

## 2019-08-29 ENCOUNTER — HOSPITAL ENCOUNTER (OUTPATIENT)
Dept: PHYSICAL THERAPY | Age: 69
Discharge: HOME | End: 2019-08-29
Payer: MEDICARE

## 2019-08-29 PROCEDURE — 97110 THERAPEUTIC EXERCISES: CPT

## 2019-08-29 PROCEDURE — 97140 MANUAL THERAPY 1/> REGIONS: CPT

## 2019-08-29 NOTE — THERAPY DISCHARGE
Mariana Pastkenna Styles  : 1950  [place]  Secondary: [address] at [address]  [phone]   [phone]       OUTPATIENT PHYSICAL THERAPY:Discharge Summary 2019   ICD-10: Treatment Diagnosis: pain in joint, shoulder, left (M25.512); adhesive capsulitis of shoulder, left (M75.02)   Precautions/Allergies:   Penicillin v and Potassium   TREATMENT PLAN:  Effective Dates: 2019 TO 2019 (60 days). Frequency/Duration: 2 times a week for 60 Day(s) MEDICAL/REFERRING DIAGNOSIS:  Left shoulder pain [M25.512]   DATE OF ONSET: 4-5 months   REFERRING PHYSICIAN: Renea Desai MD MD Orders: evaluate and treat   Return MD Appointment: 1 month      INITIAL ASSESSMENT:  Ms. Zahra Casas is a 80year old female with left shoulder pain that began 4-5 months ago with no specific mechanism or injury. She reports a gradual worsening of movement and pain that has limited her ability to reach, lift, and get dressed without pain. She presents to PT with decreased passive and active ROM, mildly decreased strength, decreased functional reaching, and decreased joint mobility that contribute to her functional limitations. Pt will benefit from skilled PT to address above listed impairments and functional limitations to facilitate return to prior level of function. 19: Ms Zahra Casas has demonstrated excellent gains with PT and has achieved all her therapy goals except quickDASH, which she has closely approximated. She has mild ROM limitation that is persistent, but has home program exercises to continue to address range of motion and reports no functional limitation associated with this limitation. As a result, she is discharged from therapy to home program at this time. PROBLEM LIST (Impacting functional limitations):  1. Decreased Strength  2. Decreased ADL/Functional Activities  3.  Increased Pain  4. Decreased Flexibility/Joint Mobility INTERVENTIONS PLANNED: (Treatment may consist of any combination of the following)  1. Cryotherapy  2. Heat  3. Home Exercise Program (HEP)  4. Manual Therapy  5. Neuromuscular Re-education/Strengthening  6. Range of Motion (ROM)  7. Therapeutic Activites  8. Therapeutic Exercise/Strengthening     GOALS: (Goals have been discussed and agreed upon with patient.)  Discharge Goals: Time Frame: by 9/20/19   1. Patient will report no limitation in reaching into an overhead cabinet to put away dishes. Achieved 8/29/19   2. Patient will report no limitation in reaching behind her back to wash her back or put on her robe. Achieved 8/29/19   3. Patient will demonstrate improvement in function with quickDASH to 20% or less disability. Excellent progress, should continue to improve with continued HEP performance     OUTCOME MEASURE:   Tool Used: Disabilities of the Arm, Shoulder and Hand (DASH) Questionnaire - Quick Version  Score:  Initial: 34/55 (52% disability)   8/29/19: 21/50 (23% disability)    Interpretation of Score: The DASH is designed to measure the activities of daily living in person's with upper extremity dysfunction or pain. Each section is scored on a 1-5 scale, 5 representing the greatest disability. The scores of each section are added together for a total score of 55.       UPDATED OBJECTIVE FINDINGS:    Range of motion:   Passive (active)  Left* Right   Flexion  155 (145) 165 (155)    External Rotation  75 80   Internal Rotation  60 65        Strength:    Manual Muscle Test out of 5   Flexion B: 4+/5    Abduction L: 4/5, R: 4+/5    External Rotation B: 4/5    Internal Rotaton L: 4/5, R: 4+/5      Functional Tests:    Apley's Internal Rotation: L: L1, R: T12  Apley's External Rotation: L: T2, R: T3   Forward Flexion: scapular hiking >110 flexion      Total Duration:  PT Patient Time In/Time Out  Time In: 1035  Time Out: 1130    Rehabilitation Potential For Stated Goals: Good  Regarding Italo Gerber therapy, I certify that the treatment plan above will be carried out by a therapist or under their direction.   Thank you for this referral,  Taryn Lima PT     Referring Physician Signature: Lisandra Spencer MD

## 2019-08-29 NOTE — PROGRESS NOTES
Ramana Perera  : 1950  [place]  Secondary: [address] at [facility]  [address], [address]  [phone]   [phone]    OUTPATIENT PHYSICAL THERAPY: Daily Treatment Note 2019  Visit Count:  9    ICD-10: Treatment Diagnosis: pain in joint, shoulder, left (M25.512); adhesive capsulitis of shoulder, left (M75.02)   Precautions/Allergies:   Penicillin v and Potassium   TREATMENT PLAN:  Effective Dates: 2019 TO 2019 (60 days). Frequency/Duration: 2 times a week for 60 Day(s)    Pre-treatment Symptoms/Complaints:  Patient reports that she can reach overhead and reach behind her back and that she is satisfied with her shoulder's level of function. Pain: Initial: Pain Intensity 1: 0 /10 Post Session:  0/10   Medications Last Reviewed:  2019  Updated Objective Findings:  See discharge note      TREATMENT:     Manual Therapy (    Soft Tissue Mobilization Duration  Duration: 15 Minutes): Manual techniques to facilitate improved motion and decreased pain. (Used abbreviations: MET - muscle energy technique; PNF - proprioceptive neuromuscular facilitation; NMR - neuromuscular re-education; a/p - anterior to posterior; p/a - posterior to anterior)   · Manual joint mobilizations: posterior, inferior, and posterior/inferior glenohumeral joint mobilizations  · Long axis traction   · Manual ROM with contract relax techniques: flexion, IR, and ER at neutral and 90 degrees  · STM to subscapularis      Therapeutic Exercise: (38 Minutes):  Exercises per grid below to improve mobility and strength. Required minimal manual and tactile cues to promote proper body mechanics. Progressed resistance, range, repetitions and complexity of movement as indicated.        2019   Activity/Exercise Parameters                 Patient education --   aarom flexion supine  Supine 10 x 10 sec with cane and 10 with 1 lb weight; elevated table with 1 lb weight 2 x 10, each diagonal on elevated table with 1 lb weight 2 x 10    IR stretch with strap  --   Self mobilization Flexion and cross body with purple strap 2 x 1 minute    AAROM flexion standing  Ball up wall: 2 x 10, 10 wall slides    Sidelying ER  --   Rows  Green cord: 3 x 10    Resisted ER  [place] cord: 3 x 10    Resisted IR  Orange cord: 3 x 10    Ball on wall  --       [facility] Portal  Treatment/Session Summary:    · Response to Treatment:  Patient with good understanding of home program to continue to perform to continue to progress independently. Discharge to home program at this time.         Total Treatment Billable Duration:  53 minutes  PT Patient Time In/Time Out  Time In: 1266  Time Out: 36  Hattie Arizmendi PT    Future Appointments   Date Time Provider Jc Rose   9/3/2019  2:30 PM Garland Lehman MD [facility]

## 2019-08-30 PROBLEM — R07.9 CHEST PAIN: Status: ACTIVE | Noted: 2019-08-30

## 2019-08-30 PROBLEM — R06.02 SOB (SHORTNESS OF BREATH): Status: ACTIVE | Noted: 2019-08-30

## 2021-06-14 ENCOUNTER — APPOINTMENT (RX ONLY)
Dept: URBAN - METROPOLITAN AREA CLINIC 24 | Facility: CLINIC | Age: 71
Setting detail: DERMATOLOGY
End: 2021-06-14

## 2021-06-14 DIAGNOSIS — Z71.89 OTHER SPECIFIED COUNSELING: ICD-10-CM

## 2021-06-14 DIAGNOSIS — L57.8 OTHER SKIN CHANGES DUE TO CHRONIC EXPOSURE TO NONIONIZING RADIATION: ICD-10-CM

## 2021-06-14 DIAGNOSIS — B07.8 OTHER VIRAL WARTS: ICD-10-CM

## 2021-06-14 PROCEDURE — ? COUNSELING

## 2021-06-14 PROCEDURE — ? OTHER

## 2021-06-14 PROCEDURE — 99213 OFFICE O/P EST LOW 20 MIN: CPT

## 2021-06-14 ASSESSMENT — LOCATION DETAILED DESCRIPTION DERM
LOCATION DETAILED: LEFT MEDIAL SUPERIOR CHEST
LOCATION DETAILED: LEFT VENTRAL DISTAL FOREARM
LOCATION DETAILED: LEFT MEDIAL TRAPEZIAL NECK
LOCATION DETAILED: RIGHT PROXIMAL DORSAL FOREARM
LOCATION DETAILED: LEFT DISTAL DORSAL FOREARM
LOCATION DETAILED: LEFT KNEE

## 2021-06-14 ASSESSMENT — LOCATION ZONE DERM
LOCATION ZONE: TRUNK
LOCATION ZONE: ARM
LOCATION ZONE: NECK
LOCATION ZONE: LEG

## 2021-06-14 ASSESSMENT — LOCATION SIMPLE DESCRIPTION DERM
LOCATION SIMPLE: LEFT FOREARM
LOCATION SIMPLE: LEFT KNEE
LOCATION SIMPLE: POSTERIOR NECK
LOCATION SIMPLE: RIGHT FOREARM
LOCATION SIMPLE: CHEST

## 2021-06-14 NOTE — HPI: FULL BODY SKIN EXAMINATION
What Type Of Note Output Would You Prefer (Optional)?: Standard Output
What Is The Reason For Today's Visit?: Annual Full Body Skin Examination with No Concerns
What Is The Reason For Today's Visit? (Being Monitored For X): concerning skin lesions on an annual basis
How Severe Are Your Spot(S)?: mild

## 2021-06-14 NOTE — PROCEDURE: OTHER
Other (Free Text): Declined LN today at visit
Detail Level: Simple
Render Risk Assessment In Note?: no
Note Text (......Xxx Chief Complaint.): This diagnosis correlates with the

## 2022-03-19 PROBLEM — R07.9 CHEST PAIN: Status: ACTIVE | Noted: 2019-08-30

## 2022-03-19 PROBLEM — R06.02 SOB (SHORTNESS OF BREATH): Status: ACTIVE | Noted: 2019-08-30

## 2022-05-29 NOTE — PROCEDURE: COUNSELING
Nathan Cormier was seen and treated in our emergency department on 5/29/2022.  He may return to work on 06/01/2022.       If you have any questions or concerns, please don't hesitate to call.      Dov Mittal MD
Detail Level: Simple
Detail Level: Generalized

## 2022-06-15 ENCOUNTER — APPOINTMENT (RX ONLY)
Dept: URBAN - METROPOLITAN AREA CLINIC 24 | Facility: CLINIC | Age: 72
Setting detail: DERMATOLOGY
End: 2022-06-15

## 2022-06-15 DIAGNOSIS — L57.8 OTHER SKIN CHANGES DUE TO CHRONIC EXPOSURE TO NONIONIZING RADIATION: ICD-10-CM

## 2022-06-15 DIAGNOSIS — D22 MELANOCYTIC NEVI: ICD-10-CM

## 2022-06-15 DIAGNOSIS — L82.1 OTHER SEBORRHEIC KERATOSIS: ICD-10-CM

## 2022-06-15 DIAGNOSIS — Z71.89 OTHER SPECIFIED COUNSELING: ICD-10-CM

## 2022-06-15 DIAGNOSIS — L57.0 ACTINIC KERATOSIS: ICD-10-CM

## 2022-06-15 DIAGNOSIS — D18.0 HEMANGIOMA: ICD-10-CM

## 2022-06-15 PROBLEM — D18.01 HEMANGIOMA OF SKIN AND SUBCUTANEOUS TISSUE: Status: ACTIVE | Noted: 2022-06-15

## 2022-06-15 PROBLEM — L70.0 ACNE VULGARIS: Status: ACTIVE | Noted: 2022-06-15

## 2022-06-15 PROBLEM — H91.90 UNSPECIFIED HEARING LOSS, UNSPECIFIED EAR: Status: ACTIVE | Noted: 2022-06-15

## 2022-06-15 PROBLEM — D22.5 MELANOCYTIC NEVI OF TRUNK: Status: ACTIVE | Noted: 2022-06-15

## 2022-06-15 PROCEDURE — ? LIQUID NITROGEN

## 2022-06-15 PROCEDURE — 17003 DESTRUCT PREMALG LES 2-14: CPT

## 2022-06-15 PROCEDURE — 99213 OFFICE O/P EST LOW 20 MIN: CPT | Mod: 25

## 2022-06-15 PROCEDURE — 17000 DESTRUCT PREMALG LESION: CPT

## 2022-06-15 PROCEDURE — ? COUNSELING

## 2022-06-15 ASSESSMENT — LOCATION DETAILED DESCRIPTION DERM
LOCATION DETAILED: RIGHT PROXIMAL PRETIBIAL REGION
LOCATION DETAILED: LEFT VENTRAL DISTAL FOREARM
LOCATION DETAILED: LEFT DISTAL DORSAL FOREARM
LOCATION DETAILED: LEFT MEDIAL TRAPEZIAL NECK
LOCATION DETAILED: RIGHT PROXIMAL DORSAL FOREARM
LOCATION DETAILED: LEFT PROXIMAL DORSAL FOREARM
LOCATION DETAILED: RIGHT LATERAL ABDOMEN
LOCATION DETAILED: RIGHT SUPERIOR MEDIAL LOWER BACK
LOCATION DETAILED: LEFT RIB CAGE
LOCATION DETAILED: LEFT MEDIAL SUPERIOR CHEST
LOCATION DETAILED: LEFT MEDIAL FOREHEAD
LOCATION DETAILED: LEFT MID-UPPER BACK
LOCATION DETAILED: LEFT INFERIOR UPPER BACK
LOCATION DETAILED: RIGHT SUPERIOR MEDIAL MIDBACK

## 2022-06-15 ASSESSMENT — LOCATION SIMPLE DESCRIPTION DERM
LOCATION SIMPLE: RIGHT FOREARM
LOCATION SIMPLE: RIGHT PRETIBIAL REGION
LOCATION SIMPLE: LEFT FOREHEAD
LOCATION SIMPLE: RIGHT LOWER BACK
LOCATION SIMPLE: LEFT UPPER BACK
LOCATION SIMPLE: LEFT FOREARM
LOCATION SIMPLE: CHEST
LOCATION SIMPLE: POSTERIOR NECK
LOCATION SIMPLE: ABDOMEN

## 2022-06-15 ASSESSMENT — LOCATION ZONE DERM
LOCATION ZONE: LEG
LOCATION ZONE: ARM
LOCATION ZONE: FACE
LOCATION ZONE: NECK
LOCATION ZONE: TRUNK

## 2022-06-15 NOTE — PROCEDURE: LIQUID NITROGEN
Show Applicator Variable?: Yes
Render Post-Care Instructions In Note?: no
Consent: The patient's consent was obtained including but not limited to risks of crusting, scabbing, blistering, scarring, darker or lighter pigmentary change, recurrence, incomplete removal and infection.
Post-Care Instructions: I reviewed with the patient in detail post-care instructions. Patient is to wear sunprotection, and avoid picking at any of the treated lesions. Pt may apply Vaseline to crusted or scabbing areas.
Detail Level: Simple
Duration Of Freeze Thaw-Cycle (Seconds): 4
Number Of Freeze-Thaw Cycles: 1 freeze-thaw cycle

## 2022-12-26 ENCOUNTER — APPOINTMENT (OUTPATIENT)
Dept: GENERAL RADIOLOGY | Age: 72
End: 2022-12-26
Payer: MEDICARE

## 2022-12-26 ENCOUNTER — HOSPITAL ENCOUNTER (EMERGENCY)
Age: 72
Discharge: HOME | End: 2022-12-26
Attending: EMERGENCY MEDICINE
Payer: MEDICARE

## 2022-12-26 VITALS
TEMPERATURE: 98.1 F | WEIGHT: 175 LBS | OXYGEN SATURATION: 98 % | BODY MASS INDEX: 27.47 KG/M2 | HEART RATE: 94 BPM | SYSTOLIC BLOOD PRESSURE: 127 MMHG | RESPIRATION RATE: 18 BRPM | HEIGHT: 67 IN | DIASTOLIC BLOOD PRESSURE: 68 MMHG

## 2022-12-26 DIAGNOSIS — S93.409A SPRAIN OF ANKLE, UNSPECIFIED LATERALITY, UNSPECIFIED LIGAMENT, INITIAL ENCOUNTER: Primary | ICD-10-CM

## 2022-12-26 DIAGNOSIS — S90.31XA CONTUSION OF RIGHT FOOT, INITIAL ENCOUNTER: ICD-10-CM

## 2022-12-26 PROCEDURE — 73630 X-RAY EXAM OF FOOT: CPT

## 2022-12-26 PROCEDURE — 73610 X-RAY EXAM OF ANKLE: CPT

## 2022-12-26 PROCEDURE — 99283 EMERGENCY DEPT VISIT LOW MDM: CPT

## 2022-12-26 PROCEDURE — 73562 X-RAY EXAM OF KNEE 3: CPT

## 2022-12-26 ASSESSMENT — ENCOUNTER SYMPTOMS: SHORTNESS OF BREATH: 0

## 2022-12-26 ASSESSMENT — PAIN SCALES - GENERAL: PAINLEVEL_OUTOF10: 3

## 2022-12-26 ASSESSMENT — PAIN - FUNCTIONAL ASSESSMENT: PAIN_FUNCTIONAL_ASSESSMENT: 0-10

## 2022-12-26 NOTE — ED TRIAGE NOTES
Pt arrives for fall that occurred last Thursday. Pt states she fell while going out the garage. Pt reports left and right ankle pain and right knee pain. No obvious deformity. No LOC and did not hit her head.

## 2022-12-26 NOTE — ED PROVIDER NOTES
Emergency Department Provider Note                   PCP:                Joseph Strickland MD               Age: 71 y.o. Sex: female     No diagnosis found. DISPOSITION          MDM  Number of Diagnoses or Management Options  Contusion of right foot, initial encounter  Sprain of ankle, unspecified laterality, unspecified ligament, initial encounter  Diagnosis management comments: X-ray shows possible avulsion of the anterior talus this is age-indeterminate. No other fractures seen. We will place an ankle splint and have follow-up with primary care for recheck and possible outpatient MRI. Amount and/or Complexity of Data Reviewed  Tests in the radiology section of CPT®: ordered and reviewed  Independent visualization of images, tracings, or specimens: yes    Risk of Complications, Morbidity, and/or Mortality  Presenting problems: low  Diagnostic procedures: low  Management options: low                                Orders Placed This Encounter   Procedures    XR ANKLE RIGHT (MIN 3 VIEWS)    XR FOOT RIGHT (MIN 3 VIEWS)    XR KNEE RIGHT (3 VIEWS)    XR ANKLE LEFT (MIN 3 VIEWS)        Medications - No data to display    New Prescriptions    No medications on file        Kandy Marshall is a 71 y.o. female who presents to the Emergency Department with chief complaint of    Chief Complaint   Patient presents with   Farrel Olayinka Fall      78-year-old female presents with bilateral lower extremity injuries after stumble and fall 4 days ago. She has been able to ambulate since the fall but she is having pain in her ankles, right foot and right knee. No head injury. No neck pain or back pain. No hip pain. The history is provided by the patient. Review of Systems   Constitutional:  Negative for fever. Respiratory:  Negative for shortness of breath. Neurological:  Negative for headaches. All other systems reviewed and are negative.     Past Medical History:   Diagnosis Date    Cancer of kidney [facility]) 2013    Clostridium difficile infection 2013    Fatigue     Fibromyalgia     Hyperlipidemia     Hypertension 1998    Hypothyroidism 2001    Infection of left inner ear 2009    Interstitial cystitis     Pneumonia     RA (rheumatoid arthritis) ([address] 2005    Reflux esophagitis         Past Surgical History:   Procedure Laterality Date    CHOLECYSTECTOMY  2001    HYSTERECTOMY (CERVIX STATUS UNKNOWN)  1976    total    KIDNEY REMOVAL  2013    left top portion of kidney        Family History   Problem Relation Age of Onset    Colon Cancer Brother         Social History     Socioeconomic History    Marital status:      Spouse name: None    Number of children: None    Years of education: None    Highest education level: None   Tobacco Use    Smoking status: Every Day     Packs/day: 1.00     Types: Cigarettes    Smokeless tobacco: Never    Tobacco comments:     Quit smoking: Pt advised that she needs to stop. LC   Substance and Sexual Activity    Alcohol use: No    Drug use: Never         Potassium     Previous Medications    CYANOCOBALAMIN 100 MCG TABLET    Take by mouth daily    CYCLOBENZAPRINE (FLEXERIL) 5 MG TABLET    Take 5 mg by mouth    DEXTROMETHORPHAN-GUAIFENESIN  MG TABS    Take 1 tablet by mouth daily    DIPHENHYDRAMINE (SOMINEX) 25 MG TABLET    Take 25 mg by mouth every 6 hours as needed    FLUTICASONE (FLONASE) 50 MCG/ACT NASAL SPRAY    Nightly. FOLIC ACID (FOLVITE) 161 MCG TABLET    Take 1,600 mcg by mouth daily    GABAPENTIN (NEURONTIN) 100 MG CAPSULE    Take by mouth 2 times daily.     LEVOTHYROXINE (SYNTHROID) 112 MCG TABLET    Take 100 mcg by mouth every morning (before breakfast)    LOPERAMIDE (IMODIUM) 2 MG CAPSULE    Take by mouth    LOSARTAN-HYDROCHLOROTHIAZIDE (HYZAAR) 100-25 MG PER TABLET    Take 1 tablet by mouth daily    MONTELUKAST (SINGULAIR) 10 MG TABLET    Take 10 mg by mouth daily    NITROGLYCERIN (NITROSTAT) 0.4 MG SL TABLET    Place 0.4 mg under the tongue    PANTOPRAZOLE (PROTONIX) 40 MG TABLET    Take 40 mg by mouth daily    POTASSIUM GLUCONATE 550 MG TABLET    Take 99 mg by mouth daily    TRAMADOL (ULTRAM) 50 MG TABLET    Take 50 mg by mouth 2 times daily. Vitals signs and nursing note reviewed. Patient Vitals for the past 4 hrs:   Temp Pulse Resp BP SpO2   12/26/22 1418 98.1 °F (36.7 °C) 94 18 127/68 98 %          Physical Exam  Vitals and nursing note reviewed. Constitutional:       General: She is not in acute distress. Appearance: Normal appearance. She is not toxic-appearing. HENT:      Head: Normocephalic and atraumatic. Cardiovascular:      Rate and Rhythm: Normal rate. Pulmonary:      Effort: Pulmonary effort is normal.   Musculoskeletal:      Cervical back: Normal range of motion. Comments: Large amount of bruising and swelling to right foot and ankle. She has focal tenderness to the second and third metatarsal area as well as lateral malleolus. She also has tenderness to the lateral aspect of the right knee near the fibular head. No bruising or swelling. Left ankle has swelling in the area of the ATF ligament. Both lower extremities have good distal pulses sensation and movement. Skin:     General: Skin is warm and dry. Neurological:      Mental Status: She is alert and oriented to person, place, and time. Psychiatric:         Mood and Affect: Mood normal.         Behavior: Behavior normal.        Procedures    No results found for any visits on 12/26/22. XR ANKLE RIGHT (MIN 3 VIEWS)    (Results Pending)   XR FOOT RIGHT (MIN 3 VIEWS)    (Results Pending)   XR KNEE RIGHT (3 VIEWS)    (Results Pending)   XR ANKLE LEFT (MIN 3 VIEWS)    (Results Pending)                       Voice dictation software was used during the making of this note. This software is not perfect and grammatical and other typographical errors may be present. This note has not been completely proofread for errors.      Shaun Barker, MD  12/26/22 1551

## 2023-08-14 ENCOUNTER — APPOINTMENT (RX ONLY)
Dept: URBAN - METROPOLITAN AREA CLINIC 24 | Facility: CLINIC | Age: 73
Setting detail: DERMATOLOGY
End: 2023-08-14

## 2023-08-14 DIAGNOSIS — L57.8 OTHER SKIN CHANGES DUE TO CHRONIC EXPOSURE TO NONIONIZING RADIATION: ICD-10-CM

## 2023-08-14 DIAGNOSIS — D18.0 HEMANGIOMA: ICD-10-CM

## 2023-08-14 DIAGNOSIS — L82.0 INFLAMED SEBORRHEIC KERATOSIS: ICD-10-CM

## 2023-08-14 DIAGNOSIS — Z71.89 OTHER SPECIFIED COUNSELING: ICD-10-CM

## 2023-08-14 DIAGNOSIS — L82.1 OTHER SEBORRHEIC KERATOSIS: ICD-10-CM

## 2023-08-14 DIAGNOSIS — D22 MELANOCYTIC NEVI: ICD-10-CM

## 2023-08-14 PROBLEM — D18.01 HEMANGIOMA OF SKIN AND SUBCUTANEOUS TISSUE: Status: ACTIVE | Noted: 2023-08-14

## 2023-08-14 PROBLEM — D22.5 MELANOCYTIC NEVI OF TRUNK: Status: ACTIVE | Noted: 2023-08-14

## 2023-08-14 PROCEDURE — 99213 OFFICE O/P EST LOW 20 MIN: CPT | Mod: 25

## 2023-08-14 PROCEDURE — ? COUNSELING

## 2023-08-14 PROCEDURE — 17110 DESTRUCTION B9 LES UP TO 14: CPT

## 2023-08-14 PROCEDURE — ? LIQUID NITROGEN

## 2023-08-14 ASSESSMENT — LOCATION ZONE DERM
LOCATION ZONE: ARM
LOCATION ZONE: TRUNK
LOCATION ZONE: NECK
LOCATION ZONE: LEG

## 2023-08-14 ASSESSMENT — LOCATION SIMPLE DESCRIPTION DERM
LOCATION SIMPLE: LEFT FOREARM
LOCATION SIMPLE: RIGHT LOWER BACK
LOCATION SIMPLE: LEFT UPPER BACK
LOCATION SIMPLE: RIGHT PRETIBIAL REGION
LOCATION SIMPLE: CHEST
LOCATION SIMPLE: POSTERIOR NECK
LOCATION SIMPLE: UPPER BACK
LOCATION SIMPLE: ABDOMEN
LOCATION SIMPLE: RIGHT FOREARM

## 2023-08-14 ASSESSMENT — LOCATION DETAILED DESCRIPTION DERM
LOCATION DETAILED: RIGHT PROXIMAL PRETIBIAL REGION
LOCATION DETAILED: LEFT DISTAL DORSAL FOREARM
LOCATION DETAILED: LEFT MEDIAL TRAPEZIAL NECK
LOCATION DETAILED: LEFT VENTRAL DISTAL FOREARM
LOCATION DETAILED: RIGHT SUPERIOR MEDIAL MIDBACK
LOCATION DETAILED: LEFT MID-UPPER BACK
LOCATION DETAILED: RIGHT PROXIMAL DORSAL FOREARM
LOCATION DETAILED: RIGHT LATERAL ABDOMEN
LOCATION DETAILED: LEFT MEDIAL SUPERIOR CHEST
LOCATION DETAILED: SUPERIOR THORACIC SPINE
LOCATION DETAILED: LEFT RIB CAGE
LOCATION DETAILED: LEFT INFERIOR UPPER BACK
LOCATION DETAILED: LEFT SUPERIOR MEDIAL UPPER BACK
LOCATION DETAILED: RIGHT SUPERIOR MEDIAL LOWER BACK

## 2023-08-14 NOTE — PROCEDURE: LIQUID NITROGEN
Spray Paint Technique: No
Spray Paint Text: The liquid nitrogen was applied to the skin utilizing a spray paint frosting technique.
Medical Necessity Information: It is in your best interest to select a reason for this procedure from the list below. All of these items fulfill various CMS LCD requirements except the new and changing color options.
Detail Level: Detailed
Show Aperture Variable?: Yes
Post-Care Instructions: I reviewed with the patient in detail post-care instructions. Patient is to wear sunprotection, and avoid picking at any of the treated lesions. Pt may apply Vaseline to crusted or scabbing areas.
Consent: The patient's consent was obtained including but not limited to risks of crusting, scabbing, blistering, scarring, darker or lighter pigmentary change, recurrence, incomplete removal and infection.
Medical Necessity Clause: This procedure was medically necessary because the lesions that were treated were:

## 2023-12-12 ENCOUNTER — APPOINTMENT (OUTPATIENT)
Dept: CT IMAGING | Age: 73
End: 2023-12-12
Payer: MEDICARE

## 2023-12-12 ENCOUNTER — HOSPITAL ENCOUNTER (EMERGENCY)
Age: 73
Discharge: HOME | End: 2023-12-12
Attending: EMERGENCY MEDICINE
Payer: MEDICARE

## 2023-12-12 VITALS
WEIGHT: 177.9 LBS | BODY MASS INDEX: 27.86 KG/M2 | DIASTOLIC BLOOD PRESSURE: 66 MMHG | RESPIRATION RATE: 18 BRPM | OXYGEN SATURATION: 97 % | SYSTOLIC BLOOD PRESSURE: 130 MMHG | TEMPERATURE: 98 F | HEART RATE: 71 BPM

## 2023-12-12 DIAGNOSIS — M54.12 CERVICAL RADICULOPATHY: Primary | ICD-10-CM

## 2023-12-12 PROCEDURE — 72125 CT NECK SPINE W/O DYE: CPT

## 2023-12-12 PROCEDURE — 96372 THER/PROPH/DIAG INJ SC/IM: CPT

## 2023-12-12 PROCEDURE — 6360000002 HC RX W HCPCS: Performed by: EMERGENCY MEDICINE

## 2023-12-12 PROCEDURE — 99284 EMERGENCY DEPT VISIT MOD MDM: CPT

## 2023-12-12 RX ORDER — KETOROLAC TROMETHAMINE 30 MG/ML
30 INJECTION, SOLUTION INTRAMUSCULAR; INTRAVENOUS
Status: COMPLETED | OUTPATIENT
Start: 2023-12-12 | End: 2023-12-12

## 2023-12-12 RX ORDER — HYDROCODONE BITARTRATE AND ACETAMINOPHEN 5; 325 MG/1; MG/1
1 TABLET ORAL EVERY 6 HOURS PRN
Qty: 15 TABLET | Refills: 0 | Status: SHIPPED | OUTPATIENT
Start: 2023-12-12 | End: 2023-12-17

## 2023-12-12 RX ADMIN — KETOROLAC TROMETHAMINE 30 MG: 30 INJECTION, SOLUTION INTRAMUSCULAR at 11:27

## 2023-12-12 ASSESSMENT — ENCOUNTER SYMPTOMS
SHORTNESS OF BREATH: 0
NAUSEA: 0
BACK PAIN: 0
CONSTIPATION: 0
COLOR CHANGE: 0
DIARRHEA: 0
VOMITING: 0
COUGH: 0
ABDOMINAL PAIN: 0

## 2023-12-12 ASSESSMENT — PAIN SCALES - GENERAL: PAINLEVEL_OUTOF10: 10

## 2023-12-12 ASSESSMENT — PAIN - FUNCTIONAL ASSESSMENT: PAIN_FUNCTIONAL_ASSESSMENT: 0-10

## 2023-12-12 NOTE — ED TRIAGE NOTES
Pt here w/ ems, picked up on side of road in the car w/ her daughter going to her rheumatologist, c/o left shoulder pain and seeing MD for it and neck and back pain, no cp or shob. VSS. Pain been going on for 2 weeks, is getting steroid shots for the pain. Did take norco to help her sleep. Reports bad pain at 0900 and getting worse. Has lung cancer in right lung, supposed to get that place removed in February.

## 2023-12-12 NOTE — ED PROVIDER NOTES
daily    NITROGLYCERIN (NITROSTAT) 0.4 MG SL TABLET    Place 0.4 mg under the tongue    PANTOPRAZOLE (PROTONIX) 40 MG TABLET    Take 40 mg by mouth daily    POTASSIUM GLUCONATE 550 MG TABLET    Take 99 mg by mouth daily    TRAMADOL (ULTRAM) 50 MG TABLET    Take 50 mg by mouth 2 times daily. Results for orders placed or performed during the hospital encounter of 12/12/23   CT CERVICAL SPINE WO CONTRAST    Narrative    CT CERVICAL SPINE WO CONTRAST - 12/12/2023 11:43 AM    COMPARISON: None    INDICATION: neck pain. TECHNIQUE:  Multiple axial images were obtained through the cervical spine without  intravenous contrast. Coronal and sagittal reformatted images were also  reviewed. Radiation dose reduction techniques were used for this study: All CT  scans performed at this facility use one or all of the following: Automated  exposure control, adjustment of the mA and/or kVp according to patient's size,  iterative reconstruction. FINDINGS:  No acute fracture seen. Mild multilevel degenerative changes including disc base narrowing, endplate  sclerosis, osteophyte formation. No spondylolisthesis seen. Prevertebral soft tissues appear unremarkable. Additional information: Biapical pulmonary scar, emphysema, pleural thickening. Note:  Spine CT test sensitivity and specificity are less than spine MR for  degenerative changes and intrathecal lesions. Impression    No acute fracture seen. Chronic findings. CT CERVICAL SPINE WO CONTRAST   Final Result   No acute fracture seen. Chronic findings. Voice dictation software was used during the making of this note. This software is not perfect and grammatical and other typographical errors may be present. This note has not been completely proofread for errors.      Екатерина Garcia MD  12/12/23 2278

## 2024-06-14 ENCOUNTER — APPOINTMENT (RX ONLY)
Dept: URBAN - METROPOLITAN AREA CLINIC 24 | Facility: CLINIC | Age: 74
Setting detail: DERMATOLOGY
End: 2024-06-14

## 2024-06-14 DIAGNOSIS — L57.8 OTHER SKIN CHANGES DUE TO CHRONIC EXPOSURE TO NONIONIZING RADIATION: ICD-10-CM

## 2024-06-14 DIAGNOSIS — L82.1 OTHER SEBORRHEIC KERATOSIS: ICD-10-CM

## 2024-06-14 DIAGNOSIS — L72.8 OTHER FOLLICULAR CYSTS OF THE SKIN AND SUBCUTANEOUS TISSUE: ICD-10-CM

## 2024-06-14 DIAGNOSIS — Z71.89 OTHER SPECIFIED COUNSELING: ICD-10-CM

## 2024-06-14 DIAGNOSIS — D18.0 HEMANGIOMA: ICD-10-CM

## 2024-06-14 DIAGNOSIS — L82.0 INFLAMED SEBORRHEIC KERATOSIS: ICD-10-CM

## 2024-06-14 DIAGNOSIS — D22 MELANOCYTIC NEVI: ICD-10-CM

## 2024-06-14 PROBLEM — D18.01 HEMANGIOMA OF SKIN AND SUBCUTANEOUS TISSUE: Status: ACTIVE | Noted: 2024-06-14

## 2024-06-14 PROBLEM — D22.5 MELANOCYTIC NEVI OF TRUNK: Status: ACTIVE | Noted: 2024-06-14

## 2024-06-14 PROCEDURE — ? COUNSELING

## 2024-06-14 PROCEDURE — ? LIQUID NITROGEN

## 2024-06-14 PROCEDURE — 17110 DESTRUCTION B9 LES UP TO 14: CPT

## 2024-06-14 PROCEDURE — 99213 OFFICE O/P EST LOW 20 MIN: CPT | Mod: 25

## 2024-06-14 ASSESSMENT — LOCATION SIMPLE DESCRIPTION DERM
LOCATION SIMPLE: ABDOMEN
LOCATION SIMPLE: LEFT FOREARM
LOCATION SIMPLE: RIGHT LOWER BACK
LOCATION SIMPLE: RIGHT FOREARM
LOCATION SIMPLE: CHEST
LOCATION SIMPLE: RIGHT UPPER BACK
LOCATION SIMPLE: RIGHT PRETIBIAL REGION
LOCATION SIMPLE: POSTERIOR NECK
LOCATION SIMPLE: LEFT UPPER BACK

## 2024-06-14 ASSESSMENT — LOCATION DETAILED DESCRIPTION DERM
LOCATION DETAILED: LEFT MID-UPPER BACK
LOCATION DETAILED: LEFT DISTAL DORSAL FOREARM
LOCATION DETAILED: RIGHT MID-UPPER BACK
LOCATION DETAILED: LEFT INFERIOR UPPER BACK
LOCATION DETAILED: RIGHT SUPERIOR MEDIAL MIDBACK
LOCATION DETAILED: LEFT RIB CAGE
LOCATION DETAILED: LEFT MEDIAL TRAPEZIAL NECK
LOCATION DETAILED: RIGHT PROXIMAL PRETIBIAL REGION
LOCATION DETAILED: LEFT MEDIAL SUPERIOR CHEST
LOCATION DETAILED: LEFT VENTRAL DISTAL FOREARM
LOCATION DETAILED: RIGHT PROXIMAL DORSAL FOREARM
LOCATION DETAILED: RIGHT SUPERIOR MEDIAL LOWER BACK
LOCATION DETAILED: RIGHT LATERAL ABDOMEN

## 2024-06-14 ASSESSMENT — LOCATION ZONE DERM
LOCATION ZONE: LEG
LOCATION ZONE: NECK
LOCATION ZONE: ARM
LOCATION ZONE: TRUNK

## 2024-06-14 NOTE — PROCEDURE: LIQUID NITROGEN
Add 52 Modifier (Optional): no
Medical Necessity Information: It is in your best interest to select a reason for this procedure from the list below. All of these items fulfill various CMS LCD requirements except the new and changing color options.
Show Applicator Variable?: Yes
Consent: The patient's consent was obtained including but not limited to risks of crusting, scabbing, blistering, scarring, darker or lighter pigmentary change, recurrence, incomplete removal and infection.
Detail Level: Detailed
Post-Care Instructions: I reviewed with the patient in detail post-care instructions. Patient is to wear sunprotection, and avoid picking at any of the treated lesions. Pt may apply Vaseline to crusted or scabbing areas.
Spray Paint Text: The liquid nitrogen was applied to the skin utilizing a spray paint frosting technique.
Medical Necessity Clause: This procedure was medically necessary because the lesions that were treated were:

## 2024-12-30 ENCOUNTER — TELEPHONE (OUTPATIENT)
Age: 74
End: 2024-12-30

## 2024-12-30 NOTE — TELEPHONE ENCOUNTER
Patient called stating that she has been having SOB with exertion, heart burn, med not helping much, chest pressure-feels full and bloated, pain under left shoulder and some nausea. Feels like she has to burp, can at times. Patient advised to go to ER at [facility] for evaluation. Patient thanked me//елена

## 2024-12-30 NOTE — TELEPHONE ENCOUNTER
Having SOB with activity. No energy. Bad heart burn. Started last week.     Pt last seen Dr. Corea in 2019 I have scheduled w/ Dr. Hart on 01/10/25

## 2024-12-31 ENCOUNTER — APPOINTMENT (OUTPATIENT)
Dept: GENERAL RADIOLOGY | Age: 74
End: 2024-12-31
Payer: MEDICARE

## 2024-12-31 ENCOUNTER — HOSPITAL ENCOUNTER (EMERGENCY)
Age: 74
Discharge: HOME | End: 2024-12-31
Attending: EMERGENCY MEDICINE
Payer: MEDICARE

## 2024-12-31 VITALS
OXYGEN SATURATION: 94 % | WEIGHT: 159 LBS | SYSTOLIC BLOOD PRESSURE: 134 MMHG | HEIGHT: 66 IN | BODY MASS INDEX: 25.55 KG/M2 | RESPIRATION RATE: 20 BRPM | HEART RATE: 95 BPM | TEMPERATURE: 98.3 F | DIASTOLIC BLOOD PRESSURE: 75 MMHG

## 2024-12-31 DIAGNOSIS — E83.42 HYPOMAGNESEMIA: Primary | ICD-10-CM

## 2024-12-31 DIAGNOSIS — N30.00 ACUTE CYSTITIS WITHOUT HEMATURIA: ICD-10-CM

## 2024-12-31 DIAGNOSIS — E87.6 HYPOKALEMIA: ICD-10-CM

## 2024-12-31 DIAGNOSIS — N17.9 ACUTE KIDNEY INJURY (HCC): ICD-10-CM

## 2024-12-31 LAB
ALBUMIN SERPL-MCNC: 4.1 G/DL (ref 3.2–4.6)
ALBUMIN/GLOB SERPL: 1.4 (ref 1–1.9)
ALP SERPL-CCNC: 118 U/L (ref 35–104)
ALT SERPL-CCNC: 14 U/L (ref 12–65)
ANION GAP SERPL CALC-SCNC: 15 MMOL/L (ref 7–16)
APPEARANCE UR: CLEAR
AST SERPL-CCNC: 15 U/L (ref 15–37)
BACTERIA URNS QL MICRO: ABNORMAL /HPF
BASOPHILS # BLD: 0 K/UL (ref 0–0.2)
BASOPHILS NFR BLD: 0 % (ref 0–2)
BILIRUB SERPL-MCNC: 0.4 MG/DL (ref 0–1.2)
BILIRUB UR QL: ABNORMAL
BUN SERPL-MCNC: 28 MG/DL (ref 8–23)
CALCIUM SERPL-MCNC: 9.7 MG/DL (ref 8.8–10.2)
CASTS URNS QL MICRO: ABNORMAL /LPF
CHLORIDE SERPL-SCNC: 103 MMOL/L (ref 98–107)
CO2 SERPL-SCNC: 22 MMOL/L (ref 20–29)
COLOR UR: ABNORMAL
CREAT SERPL-MCNC: 2.01 MG/DL (ref 0.8–1.3)
DIFFERENTIAL METHOD BLD: ABNORMAL
EOSINOPHIL # BLD: 0.2 K/UL (ref 0–0.8)
EOSINOPHIL NFR BLD: 1 % (ref 0.5–7.8)
EPI CELLS #/AREA URNS HPF: ABNORMAL /HPF
ERYTHROCYTE [DISTWIDTH] IN BLOOD BY AUTOMATED COUNT: 13.2 % (ref 11.9–14.6)
GLOBULIN SER CALC-MCNC: 2.9 G/DL (ref 2.3–3.5)
GLUCOSE SERPL-MCNC: 123 MG/DL (ref 65–100)
GLUCOSE UR STRIP.AUTO-MCNC: NEGATIVE MG/DL
HCT VFR BLD AUTO: 39.8 % (ref 35.8–46.3)
HGB BLD-MCNC: 13.4 G/DL (ref 11.7–15.4)
HGB UR QL STRIP: NEGATIVE
IMM GRANULOCYTES # BLD AUTO: 0.1 K/UL (ref 0–0.5)
IMM GRANULOCYTES NFR BLD AUTO: 0 % (ref 0–5)
KETONES UR QL STRIP.AUTO: 15 MG/DL
LEUKOCYTE ESTERASE UR QL STRIP.AUTO: ABNORMAL
LYMPHOCYTES # BLD: 4 K/UL (ref 0.5–4.6)
LYMPHOCYTES NFR BLD: 30 % (ref 13–44)
MAGNESIUM SERPL-MCNC: 1.2 MG/DL (ref 1.8–2.4)
MCH RBC QN AUTO: 32 PG (ref 26.1–32.9)
MCHC RBC AUTO-ENTMCNC: 33.7 G/DL (ref 31.4–35)
MCV RBC AUTO: 95 FL (ref 82–102)
MONOCYTES # BLD: 0.9 K/UL (ref 0.1–1.3)
MONOCYTES NFR BLD: 6 % (ref 4–12)
MUCOUS THREADS URNS QL MICRO: 0 /LPF
NEUTS SEG # BLD: 8.4 K/UL (ref 1.7–8.2)
NEUTS SEG NFR BLD: 62 % (ref 43–78)
NITRITE UR QL STRIP.AUTO: NEGATIVE
NRBC # BLD: 0 K/UL (ref 0–0.2)
OTHER OBSERVATIONS: ABNORMAL
PH UR STRIP: 5.5 (ref 5–9)
PLATELET # BLD AUTO: 360 K/UL (ref 150–450)
PMV BLD AUTO: 8.9 FL (ref 9.4–12.3)
POTASSIUM SERPL-SCNC: 3.4 MMOL/L (ref 3.5–5.1)
PROT SERPL-MCNC: 7 G/DL (ref 6.3–8.2)
PROT UR STRIP-MCNC: 100 MG/DL
RBC # BLD AUTO: 4.19 M/UL (ref 4.05–5.2)
RBC #/AREA URNS HPF: ABNORMAL /HPF
SODIUM SERPL-SCNC: 140 MMOL/L (ref 133–143)
SP GR UR REFRACTOMETRY: 1.02 (ref 1–1.02)
TROPONIN T SERPL HS-MCNC: 23.1 NG/L (ref 0–14)
UROBILINOGEN UR QL STRIP.AUTO: 1 EU/DL (ref 0.2–1)
WBC # BLD AUTO: 13.5 K/UL (ref 4.3–11.1)
WBC URNS QL MICRO: ABNORMAL /HPF

## 2024-12-31 PROCEDURE — 80053 COMPREHEN METABOLIC PANEL: CPT

## 2024-12-31 PROCEDURE — 96366 THER/PROPH/DIAG IV INF ADDON: CPT

## 2024-12-31 PROCEDURE — 99285 EMERGENCY DEPT VISIT HI MDM: CPT

## 2024-12-31 PROCEDURE — 71046 X-RAY EXAM CHEST 2 VIEWS: CPT

## 2024-12-31 PROCEDURE — 84484 ASSAY OF TROPONIN QUANT: CPT

## 2024-12-31 PROCEDURE — 81001 URINALYSIS AUTO W/SCOPE: CPT

## 2024-12-31 PROCEDURE — 93005 ELECTROCARDIOGRAM TRACING: CPT | Performed by: EMERGENCY MEDICINE

## 2024-12-31 PROCEDURE — 2580000003 HC RX 258: Performed by: EMERGENCY MEDICINE

## 2024-12-31 PROCEDURE — 96375 TX/PRO/DX INJ NEW DRUG ADDON: CPT

## 2024-12-31 PROCEDURE — 2500000003 HC RX 250 WO HCPCS: Performed by: EMERGENCY MEDICINE

## 2024-12-31 PROCEDURE — 87186 SC STD MICRODIL/AGAR DIL: CPT

## 2024-12-31 PROCEDURE — 87088 URINE BACTERIA CULTURE: CPT

## 2024-12-31 PROCEDURE — 87086 URINE CULTURE/COLONY COUNT: CPT

## 2024-12-31 PROCEDURE — 6370000000 HC RX 637 (ALT 250 FOR IP): Performed by: EMERGENCY MEDICINE

## 2024-12-31 PROCEDURE — 83735 ASSAY OF MAGNESIUM: CPT

## 2024-12-31 PROCEDURE — 96365 THER/PROPH/DIAG IV INF INIT: CPT

## 2024-12-31 PROCEDURE — 6360000002 HC RX W HCPCS: Performed by: EMERGENCY MEDICINE

## 2024-12-31 PROCEDURE — 85025 COMPLETE CBC W/AUTO DIFF WBC: CPT

## 2024-12-31 RX ORDER — MAGNESIUM SULFATE IN WATER 40 MG/ML
2000 INJECTION, SOLUTION INTRAVENOUS ONCE
Status: COMPLETED | OUTPATIENT
Start: 2024-12-31 | End: 2024-12-31

## 2024-12-31 RX ORDER — 0.9 % SODIUM CHLORIDE 0.9 %
1000 INTRAVENOUS SOLUTION INTRAVENOUS ONCE
Status: COMPLETED | OUTPATIENT
Start: 2024-12-31 | End: 2024-12-31

## 2024-12-31 RX ORDER — MAGNESIUM OXIDE 400 MG/1
400 TABLET ORAL DAILY
Qty: 30 TABLET | Refills: 1 | Status: SHIPPED | OUTPATIENT
Start: 2024-12-31

## 2024-12-31 RX ORDER — CEFDINIR 300 MG/1
300 CAPSULE ORAL DAILY
Qty: 7 CAPSULE | Refills: 0 | Status: SHIPPED | OUTPATIENT
Start: 2024-12-31 | End: 2025-01-07

## 2024-12-31 RX ORDER — POTASSIUM CHLORIDE 1500 MG/1
40 TABLET, EXTENDED RELEASE ORAL
Status: COMPLETED | OUTPATIENT
Start: 2024-12-31 | End: 2024-12-31

## 2024-12-31 RX ADMIN — WATER 1000 MG: 1 INJECTION INTRAMUSCULAR; INTRAVENOUS; SUBCUTANEOUS at 18:34

## 2024-12-31 RX ADMIN — SODIUM CHLORIDE 1000 ML: 9 INJECTION, SOLUTION INTRAVENOUS at 18:32

## 2024-12-31 RX ADMIN — POTASSIUM CHLORIDE 40 MEQ: 1500 TABLET, EXTENDED RELEASE ORAL at 18:32

## 2024-12-31 RX ADMIN — MAGNESIUM SULFATE HEPTAHYDRATE 2000 MG: 40 INJECTION, SOLUTION INTRAVENOUS at 18:42

## 2024-12-31 ASSESSMENT — LIFESTYLE VARIABLES
HOW MANY STANDARD DRINKS CONTAINING ALCOHOL DO YOU HAVE ON A TYPICAL DAY: PATIENT DOES NOT DRINK
HOW OFTEN DO YOU HAVE A DRINK CONTAINING ALCOHOL: NEVER

## 2024-12-31 ASSESSMENT — PAIN SCALES - GENERAL: PAINLEVEL_OUTOF10: 4

## 2024-12-31 ASSESSMENT — PAIN - FUNCTIONAL ASSESSMENT: PAIN_FUNCTIONAL_ASSESSMENT: 0-10

## 2024-12-31 NOTE — ED TRIAGE NOTES
Pt states she had a cold two weeks before [place], got better, but since [place] has been SOB. Has a cardiology appt next week but the nurse at the office advised her to come on in to the ED to be evaluated. Pt reports lingering cough from her cold. Pt has history of lung cancer. Pt also c/o acid reflux.

## 2024-12-31 NOTE — ED PROVIDER NOTES
Procedure Laterality Date    CHOLECYSTECTOMY  2001    HERNIA REPAIR      HYSTERECTOMY (CERVIX STATUS UNKNOWN)  1976    total    KIDNEY REMOVAL  2013    left top portion of kidney    LUNG SURGERY  02/2024        Social History     Socioeconomic History    Marital status:      Spouse name: None    Number of children: None    Years of education: None    Highest education level: None   Tobacco Use    Smoking status: Every Day     Current packs/day: 1.00     Types: Cigarettes    Smokeless tobacco: Never    Tobacco comments:     Quit smoking: Pt advised that she needs to stop. LC   Vaping Use    Vaping status: Never Used   Substance and Sexual Activity    Alcohol use: No    Drug use: Never     Social Determinants of Health     Financial Resource Strain: Low Risk  (2/12/2024)    Received from [facility]    Financial Resource Strain     Difficulty Paying Living Expenses: Not hard at all     Difficulty Paying Medical Expenses: No   Food Insecurity: No Food Insecurity (8/16/2023)    [facility]    Food Insecurity     Worried about Running Out of Food in the Last Year: Never true     Ran Out of Food in the Last Year: Never true   Transportation Needs: No Transportation Needs (8/16/2023)    [facility]    Transportation Needs     Lack of Transportation: No   Physical Activity: Insufficiently Active (2/12/2024)    Received from [facility]    Physical Activity     Days of Exercise per Week: 4     Minutes of Exercise per Session: 30     Total Minutes of Exercise per Week: 120   Stress: No Stress Concern Present (2/12/2024)    Received from [facility]    Stress     Feeling of Stress : Not at all   Social Connections: Socially Integrated (2/12/2024)    Received from [facility]    Social Connections     Frequency of Communication with Friends and Family: More than three times a week     Frequency of Social Gatherings with Friends and Family: More than

## 2025-01-01 LAB
EKG ATRIAL RATE: 101 BPM
EKG DIAGNOSIS: NORMAL
EKG P AXIS: 64 DEGREES
EKG P-R INTERVAL: 158 MS
EKG Q-T INTERVAL: 338 MS
EKG QRS DURATION: 82 MS
EKG QTC CALCULATION (BAZETT): 438 MS
EKG R AXIS: 3 DEGREES
EKG T AXIS: 75 DEGREES
EKG VENTRICULAR RATE: 101 BPM

## 2025-01-01 PROCEDURE — 93010 ELECTROCARDIOGRAM REPORT: CPT | Performed by: INTERNAL MEDICINE

## 2025-01-03 LAB
BACTERIA SPEC CULT: ABNORMAL
BACTERIA SPEC CULT: ABNORMAL
SERVICE CMNT-IMP: ABNORMAL

## 2025-02-09 ENCOUNTER — HOSPITAL ENCOUNTER (EMERGENCY)
Age: 75
Discharge: HOME | End: 2025-02-09
Payer: MEDICARE

## 2025-02-09 VITALS
DIASTOLIC BLOOD PRESSURE: 74 MMHG | SYSTOLIC BLOOD PRESSURE: 112 MMHG | TEMPERATURE: 99 F | BODY MASS INDEX: 25.23 KG/M2 | HEART RATE: 95 BPM | HEIGHT: 66 IN | OXYGEN SATURATION: 98 % | RESPIRATION RATE: 16 BRPM | WEIGHT: 157 LBS

## 2025-02-09 DIAGNOSIS — H66.002 NON-RECURRENT ACUTE SUPPURATIVE OTITIS MEDIA OF LEFT EAR WITHOUT SPONTANEOUS RUPTURE OF TYMPANIC MEMBRANE: Primary | ICD-10-CM

## 2025-02-09 LAB
FLUAV RNA SPEC QL NAA+PROBE: NOT DETECTED
FLUBV RNA SPEC QL NAA+PROBE: NOT DETECTED
SARS-COV-2 RDRP RESP QL NAA+PROBE: NOT DETECTED
SOURCE: NORMAL
STREP, MOLECULAR: NOT DETECTED

## 2025-02-09 PROCEDURE — 87502 INFLUENZA DNA AMP PROBE: CPT

## 2025-02-09 PROCEDURE — 87635 SARS-COV-2 COVID-19 AMP PRB: CPT

## 2025-02-09 PROCEDURE — 87651 STREP A DNA AMP PROBE: CPT

## 2025-02-09 PROCEDURE — 99283 EMERGENCY DEPT VISIT LOW MDM: CPT

## 2025-02-09 RX ORDER — FLUCONAZOLE 150 MG/1
TABLET ORAL
Qty: 2 TABLET | Refills: 0 | Status: SHIPPED | OUTPATIENT
Start: 2025-02-09

## 2025-02-09 ASSESSMENT — PAIN DESCRIPTION - LOCATION
LOCATION: GENERALIZED
LOCATION: GENERALIZED

## 2025-02-09 ASSESSMENT — PAIN SCALES - GENERAL
PAINLEVEL_OUTOF10: 7
PAINLEVEL_OUTOF10: 5

## 2025-02-09 ASSESSMENT — PAIN - FUNCTIONAL ASSESSMENT
PAIN_FUNCTIONAL_ASSESSMENT: 0-10
PAIN_FUNCTIONAL_ASSESSMENT: 0-10

## 2025-02-09 ASSESSMENT — PAIN DESCRIPTION - DESCRIPTORS: DESCRIPTORS: ACHING

## 2025-02-09 NOTE — ED PROVIDER NOTES
Weight: 71.2 kg (157 lb)   Height: 1.676 m (5' 6\")      Physical Exam  Vitals and nursing note reviewed.   Constitutional:       General: She is not in acute distress.     Appearance: Normal appearance. She is well-developed. She is not ill-appearing, toxic-appearing or diaphoretic.   HENT:      Head: Normocephalic and atraumatic.      Right Ear: No drainage or tenderness. A middle ear effusion is present. No mastoid tenderness.      Left Ear: No drainage or tenderness. A middle ear effusion is present. No mastoid tenderness. Tympanic membrane is scarred, erythematous and bulging.      Nose: Rhinorrhea present.      Mouth/Throat:      Pharynx: Posterior oropharyngeal erythema present. No pharyngeal swelling.      Tonsils: No tonsillar exudate or tonsillar abscesses.   Cardiovascular:      Rate and Rhythm: Normal rate.      Heart sounds: Normal heart sounds.   Pulmonary:      Effort: Pulmonary effort is normal. No respiratory distress.      Breath sounds: Normal breath sounds.   Neurological:      General: No focal deficit present.      Mental Status: She is alert and oriented to person, place, and time.        Procedures     Procedures    Orders placed during this emergency department visit:     Orders Placed This Encounter   Procedures    COVID-19, Rapid    Influenza A/B, Molecular    Group A Strep Screen By PCR        Medications given during this emergency department visit:   Medications - No data to display    New prescriptions:     New Prescriptions    AMOXICILLIN-CLAVULANATE (AUGMENTIN) 875-125 MG PER TABLET    Take 1 tablet by mouth 2 times daily for 7 days    FLUCONAZOLE (DIFLUCAN) 150 MG TABLET    Take one tablet by mouth now. Take second tablet after completion of antibiotic.        Past History and Complexity:     Past Medical History:   Diagnosis Date    Cancer of kidney (HCC) 2013    Clostridium difficile infection 2013    Fatigue     Fibromyalgia     Hyperlipidemia     Hypertension 1998

## 2025-02-09 NOTE — ED TRIAGE NOTES
Pt ambulatory to triage with the use of a cane with a steady gait. Pt reports bilateral ear pain with worsening pain in left ear and buzzing in left ear, sore throat, cough, headache and generalized body aches that started Friday. Pt denies having tylenol or ibuprofen.

## 2025-02-09 NOTE — DISCHARGE INSTRUCTIONS
Your exam is concerning for an ear infection but I also suspect you have a viral respiratory infection. Take antibiotic as prescribed. Stay well hydrated. Take tylenol and/or ibuprofen if needed for fever, body aches, or headaches. Take over-the-counter Mucinex DM, Delsym, or your preferred over-the-counter cough medication if needed. Return to the ER for any new, worsening, or concerning symptoms.

## 2025-03-13 ENCOUNTER — OFFICE VISIT (OUTPATIENT)
Age: 75
End: 2025-03-13
Payer: MEDICARE

## 2025-03-13 DIAGNOSIS — M54.50 LOW BACK PAIN, UNSPECIFIED BACK PAIN LATERALITY, UNSPECIFIED CHRONICITY, UNSPECIFIED WHETHER SCIATICA PRESENT: Primary | ICD-10-CM

## 2025-03-13 DIAGNOSIS — M47.812 ARTHROPATHY OF CERVICAL FACET JOINT: ICD-10-CM

## 2025-03-13 DIAGNOSIS — M54.16 LUMBAR RADICULOPATHY: ICD-10-CM

## 2025-03-13 DIAGNOSIS — M50.30 DEGENERATIVE DISC DISEASE, CERVICAL: ICD-10-CM

## 2025-03-13 DIAGNOSIS — M51.362 DEGENERATION OF INTERVERTEBRAL DISC OF LUMBAR REGION WITH DISCOGENIC BACK PAIN AND LOWER EXTREMITY PAIN: ICD-10-CM

## 2025-03-13 PROCEDURE — 1090F PRES/ABSN URINE INCON ASSESS: CPT | Performed by: NURSE PRACTITIONER

## 2025-03-13 PROCEDURE — G8400 PT W/DXA NO RESULTS DOC: HCPCS | Performed by: NURSE PRACTITIONER

## 2025-03-13 PROCEDURE — G2211 COMPLEX E/M VISIT ADD ON: HCPCS | Performed by: NURSE PRACTITIONER

## 2025-03-13 PROCEDURE — 4004F PT TOBACCO SCREEN RCVD TLK: CPT | Performed by: NURSE PRACTITIONER

## 2025-03-13 PROCEDURE — G8428 CUR MEDS NOT DOCUMENT: HCPCS | Performed by: NURSE PRACTITIONER

## 2025-03-13 PROCEDURE — 3017F COLORECTAL CA SCREEN DOC REV: CPT | Performed by: NURSE PRACTITIONER

## 2025-03-13 PROCEDURE — 1123F ACP DISCUSS/DSCN MKR DOCD: CPT | Performed by: NURSE PRACTITIONER

## 2025-03-13 PROCEDURE — G8419 CALC BMI OUT NRM PARAM NOF/U: HCPCS | Performed by: NURSE PRACTITIONER

## 2025-03-13 PROCEDURE — 99204 OFFICE O/P NEW MOD 45 MIN: CPT | Performed by: NURSE PRACTITIONER

## 2025-03-13 RX ORDER — CLOPIDOGREL BISULFATE 75 MG/1
75 TABLET ORAL DAILY
COMMUNITY
Start: 2025-01-22

## 2025-03-13 RX ORDER — VITAMIN B COMPLEX
1 CAPSULE ORAL ONCE
COMMUNITY

## 2025-03-13 RX ORDER — SENNOSIDES 8.6 MG
1300 CAPSULE ORAL EVERY 8 HOURS PRN
COMMUNITY

## 2025-03-13 RX ORDER — AMLODIPINE BESYLATE 10 MG/1
10 TABLET ORAL DAILY
COMMUNITY
Start: 2025-01-22

## 2025-03-13 NOTE — PROGRESS NOTES
there is some posterior disc base narrowing to the L5-S1 disc.  No fractures or lesions.    Interpretation: Multilevel lumbar spondylosis and degenerative disc disease      CT scan of the cervical spine from 2023 images independently reviewed by myself: Diffuse degenerative disc disease is noted    Assessment/Plan:       Diagnosis Orders   1. Low back pain, unspecified back pain laterality, unspecified chronicity, unspecified whether sciatica present  XR LUMBAR SPINE (2-3 VIEWS)      2. Arthropathy of cervical facet joint        3. Degenerative disc disease, cervical        4. Lumbar radiculopathy        5. Degeneration of intervertebral disc of lumbar region with discogenic back pain and lower extremity pain            This patient's clinical history and physical exam is consistent with a left  L-5 lumbar radiculopathy patient has degenerative disc disease of the lower lumbar spine.  In regards to her chronic neck pain she has mild cervical degenerative changes as well.  I suspect there is probably more facet arthropathy.  We will going obtain MRIs of both the cervical and lumbar spine.  I will start her in physical therapy for both.  She is going to go ahead and take the Medrol pack that her ear nose and throat doctor provided her    . We discussed the natural history of lumbar radiculopathy in that many of these patients have near complete resolution of their symptoms within eight to twelve weeks with conservative care. We discussed that conservative treatments typically start with activity modification, and medication followed by physical therapy as symptoms allow.  Oral and/or epidural steroids are other options. I also discussed potential surgical options if the symptoms fail to improve or there is a progressive neurologic deficit and conservative management has been exhausted.  We discussed that surgery is not typically a reliable treatment for isolated back pain, but is usually very reliable in relieving

## 2025-04-08 ENCOUNTER — OFFICE VISIT (OUTPATIENT)
Age: 75
End: 2025-04-08
Payer: MEDICARE

## 2025-04-08 DIAGNOSIS — M48.02 CERVICAL STENOSIS OF SPINAL CANAL: Primary | ICD-10-CM

## 2025-04-08 DIAGNOSIS — M48.061 LUMBAR FORAMINAL STENOSIS: ICD-10-CM

## 2025-04-08 DIAGNOSIS — M51.360 DEGENERATION OF INTERVERTEBRAL DISC OF LUMBAR REGION WITH DISCOGENIC BACK PAIN: ICD-10-CM

## 2025-04-08 DIAGNOSIS — M47.812 ARTHROPATHY OF CERVICAL FACET JOINT: ICD-10-CM

## 2025-04-08 DIAGNOSIS — M47.816 LUMBAR FACET ARTHROPATHY: ICD-10-CM

## 2025-04-08 PROCEDURE — 1090F PRES/ABSN URINE INCON ASSESS: CPT | Performed by: NURSE PRACTITIONER

## 2025-04-08 PROCEDURE — 3017F COLORECTAL CA SCREEN DOC REV: CPT | Performed by: NURSE PRACTITIONER

## 2025-04-08 PROCEDURE — G8428 CUR MEDS NOT DOCUMENT: HCPCS | Performed by: NURSE PRACTITIONER

## 2025-04-08 PROCEDURE — 99214 OFFICE O/P EST MOD 30 MIN: CPT | Performed by: NURSE PRACTITIONER

## 2025-04-08 PROCEDURE — G8419 CALC BMI OUT NRM PARAM NOF/U: HCPCS | Performed by: NURSE PRACTITIONER

## 2025-04-08 PROCEDURE — G8400 PT W/DXA NO RESULTS DOC: HCPCS | Performed by: NURSE PRACTITIONER

## 2025-04-08 PROCEDURE — 1123F ACP DISCUSS/DSCN MKR DOCD: CPT | Performed by: NURSE PRACTITIONER

## 2025-04-08 PROCEDURE — 4004F PT TOBACCO SCREEN RCVD TLK: CPT | Performed by: NURSE PRACTITIONER

## 2025-04-08 NOTE — PROGRESS NOTES
Name: Ryan Rivera  YOB: 1950  Gender: female  MRN: [id number]    CC: Follow-up chronic left neck pain, low back pain, bearing leg pain    HPI: This is a 74 y.o. year old female who presented with many year history of low back pain.  She has a history of fibromyalgia and rheumatoid arthritis.  She is under the care of rheumatology.  She has left-sided neck pain is pretty constant.  She is tried Flexeril, tramadol, Tylenol, gabapentin.  She is on Plavix.  She at last visit had pain in the lower back rating down the left lateral leg to her ankle.  She states that since I seen her that has improved.  Now she feels more pain on the right back.  At last visit she was referred for MRIs of the cervical and lumbar spine.    She also has a history of lung cancer      3/13/2025    10:39 AM   AMB PAIN ASSESSMENT   Location of Pain Back   Location Modifiers Left   Severity of Pain 8   Frequency of Pain Intermittent   Limiting Behavior Yes   Result of Injury No   Work-Related Injury No   Are there other pain locations you wish to document? No            No Known Allergies    Current Outpatient Medications:     amLODIPine (NORVASC) 10 MG tablet, Take 1 tablet by mouth daily, Disp: , Rfl:     acetaminophen (TYLENOL) 650 MG extended release tablet, Take 2 tablets by mouth every 8 hours as needed, Disp: , Rfl:     b complex vitamins capsule, Take 1 capsule by mouth once, Disp: , Rfl:     clopidogrel (PLAVIX) 75 MG tablet, Take 1 tablet by mouth daily, Disp: , Rfl:     vitamin D (VITAMIN D3) 25 MCG (1000 UT) CAPS, Take by mouth daily, Disp: , Rfl:     fluconazole (DIFLUCAN) 150 MG tablet, Take one tablet by mouth now. Take second tablet after completion of antibiotic. (Patient not taking: Reported on 3/13/2025), Disp: 2 tablet, Rfl: 0    magnesium oxide (MAG-OX) 400 MG tablet, Take 1 tablet by mouth daily, Disp: 30 tablet, Rfl: 1    cyanocobalamin 100 MCG tablet, Take by mouth daily, Disp: , Rfl:

## 2025-04-09 ENCOUNTER — TELEPHONE (OUTPATIENT)
Dept: ORTHOPEDIC SURGERY | Age: 75
End: 2025-04-09

## 2025-04-09 NOTE — TELEPHONE ENCOUNTER
[facility] with patient [facility]

## 2025-04-09 NOTE — TELEPHONE ENCOUNTER
Patient is requesting her pain mng referral to be sent to [facility] in [place] close to where she lives

## 2025-04-14 ENCOUNTER — CLINICAL DOCUMENTATION (OUTPATIENT)
Age: 75
End: 2025-04-14

## 2025-04-14 NOTE — PROGRESS NOTES
Patient referred to [facility] . Referral has been received with practice but not yet scheduled.

## 2025-04-17 ENCOUNTER — CLINICAL DOCUMENTATION (OUTPATIENT)
Age: 75
End: 2025-04-17